# Patient Record
Sex: MALE | Race: WHITE | NOT HISPANIC OR LATINO | Employment: OTHER | URBAN - METROPOLITAN AREA
[De-identification: names, ages, dates, MRNs, and addresses within clinical notes are randomized per-mention and may not be internally consistent; named-entity substitution may affect disease eponyms.]

---

## 2017-04-20 ENCOUNTER — OFFICE VISIT (OUTPATIENT)
Dept: PULMONOLOGY | Facility: CLINIC | Age: 82
End: 2017-04-20
Payer: MEDICARE

## 2017-04-20 ENCOUNTER — LAB VISIT (OUTPATIENT)
Dept: LAB | Facility: HOSPITAL | Age: 82
End: 2017-04-20
Attending: INTERNAL MEDICINE
Payer: MEDICARE

## 2017-04-20 VITALS
BODY MASS INDEX: 25.97 KG/M2 | HEART RATE: 77 BPM | DIASTOLIC BLOOD PRESSURE: 87 MMHG | HEIGHT: 74 IN | WEIGHT: 202.38 LBS | SYSTOLIC BLOOD PRESSURE: 146 MMHG | OXYGEN SATURATION: 97 %

## 2017-04-20 DIAGNOSIS — J41.1 CHRONIC BRONCHITIS, MUCOPURULENT: ICD-10-CM

## 2017-04-20 DIAGNOSIS — J44.9 MIXED TYPE COPD (CHRONIC OBSTRUCTIVE PULMONARY DISEASE): Primary | ICD-10-CM

## 2017-04-20 DIAGNOSIS — J47.9 BRONCHIECTASIS WITHOUT COMPLICATION: ICD-10-CM

## 2017-04-20 DIAGNOSIS — J44.9 MIXED TYPE COPD (CHRONIC OBSTRUCTIVE PULMONARY DISEASE): ICD-10-CM

## 2017-04-20 DIAGNOSIS — I48.91 ATRIAL FIBRILLATION, UNSPECIFIED TYPE: ICD-10-CM

## 2017-04-20 LAB — DIGOXIN SERPL-MCNC: 0.6 NG/ML

## 2017-04-20 PROCEDURE — 99214 OFFICE O/P EST MOD 30 MIN: CPT | Mod: S$PBB,,, | Performed by: INTERNAL MEDICINE

## 2017-04-20 PROCEDURE — 82784 ASSAY IGA/IGD/IGG/IGM EACH: CPT

## 2017-04-20 PROCEDURE — 82785 ASSAY OF IGE: CPT

## 2017-04-20 PROCEDURE — 80162 ASSAY OF DIGOXIN TOTAL: CPT

## 2017-04-20 PROCEDURE — 82784 ASSAY IGA/IGD/IGG/IGM EACH: CPT | Mod: 59

## 2017-04-20 PROCEDURE — 99214 OFFICE O/P EST MOD 30 MIN: CPT | Mod: PBBFAC,PO | Performed by: INTERNAL MEDICINE

## 2017-04-20 PROCEDURE — 86648 DIPHTHERIA ANTIBODY: CPT

## 2017-04-20 PROCEDURE — 99999 PR PBB SHADOW E&M-EST. PATIENT-LVL IV: CPT | Mod: PBBFAC,,, | Performed by: INTERNAL MEDICINE

## 2017-04-20 RX ORDER — AZITHROMYCIN 500 MG/1
TABLET, FILM COATED ORAL
Qty: 3 TABLET | Refills: 3 | Status: SHIPPED | OUTPATIENT
Start: 2017-04-20 | End: 2018-07-23 | Stop reason: SDUPTHER

## 2017-04-20 NOTE — MR AVS SNAPSHOT
[facility]  [address]  [place] [address]  Phone: [phone]                  Vazquez [place]   2017 2:00 PM   Office Visit    Description:  Male : 1932   Provider:  Eitan Mendez MD   Department:  [facility]           Reason for Visit     COPD     Shortness of Breath     Cough     Wheezing     Sputum Production           Diagnoses this Visit        Comments    Mixed type COPD (chronic obstructive pulmonary disease)    -  Primary     Bronchiectasis without complication         Atrial fibrillation, unspecified type         Chronic bronchitis, mucopurulent                To Do List           Future Appointments        Provider Department Dept Phone    2017 2:00 PM MD [facility] [phone]      Goals (5 Years of Data)     None      Follow-Up and Disposition     Return in about 3 months (around 2017).    Follow-up and Disposition History       These Medications        Disp Refills Start End    azithromycin (ZITHROMAX) 500 MG tablet 3 tablet 3 2017     One daily for yellow mucous, repeat if needed.   Cut lanoxin in 1/2 for ten days after starting zithromycin and obtain blood level after 3 rd day.    Pharmacy: [facility] [address] [place] [address] AT [facility] [address] Ph #: [phone]       umeclidinium-vilanterol (ANORO ELLIPTA) 62.5-25 mcg/actuation DsDv 3 each 3 2017     Inhale 1 puff into the lungs once daily. - Inhalation    Pharmacy: [facility] - [address] Ph #: [phone]         [facility]     [facility] - 24/ Assistance  Unless otherwise directed by your provider, please contact [facility], our nurse care line that is available for 24/ assistance.     Registered nurses in the [facility] provide: appointment scheduling, clinical advisement, health education, and  other advisory services.  Call: [phone] (toll free)               Medications           Message regarding Medications     Verify the changes and/or additions to your medication regime listed below are the same as discussed with your clinician today.  If any of these changes or additions are incorrect, please notify your healthcare provider.        START taking these NEW medications        Refills    azithromycin (ZITHROMAX) 500 MG tablet 3    Sig: One daily for yellow mucous, repeat if needed.   Cut lanoxin in 1/2 for ten days after starting zithromycin and obtain blood level after 3 rd day.    Class: Print    umeclidinium-vilanterol (ANORO ELLIPTA) 62.5-25 mcg/actuation DsDv 3    Sig: Inhale 1 puff into the lungs once daily.    Class: Normal    Route: Inhalation      STOP taking these medications     fluticasone-vilanterol (BREO) 100-25 mcg/dose diskus inhaler Inhale 1 puff into the lungs once daily.    hydrocodone-acetaminophen 5-325mg (NORCO) 5-325 mg per tablet Take 1 tablet by mouth every 6 (six) hours as needed for Pain (cough).    SPIRIVA RESPIMAT 2.5 mcg/actuation Mist Inhale 2 Inhalers into the lungs once daily.           Verify that the below list of medications is an accurate representation of the medications you are currently taking.  If none reported, the list may be blank. If incorrect, please contact your healthcare provider. Carry this list with you in case of emergency.           Current Medications     amlodipine (NORVASC) 5 MG tablet Take 5 mg by mouth once daily.    aspirin (ECOTRIN) 81 MG EC tablet Take 81 mg by mouth once daily.    calcitRIOL (ROCALTROL) 0.25 MCG Cap     digoxin (LANOXIN) 125 mcg tablet Take 125 mcg by mouth once daily.    fish oil-omega-3 fatty acids 300-1,000 mg capsule Take 2 g by mouth once daily.    multivit, min cmb#20-iron-FA 27-1 mg Tab Take 1 tablet by mouth once daily.    potassium chloride (KLOR-CON) 10 MEQ TbSR     PRADAXA 75 mg Cap     PROAIR HFA 90  "mcg/actuation inhaler     simvastatin (ZOCOR) 20 MG tablet Take 20 mg by mouth every evening.    tamsulosin (FLOMAX) 0.4 mg Cp24 TAKE 1 CAPSULE ONCE DAILY    azithromycin (ZITHROMAX) 500 MG tablet One daily for yellow mucous, repeat if needed    azithromycin (ZITHROMAX) 500 MG tablet One daily for yellow mucous, repeat if needed.   Cut lanoxin in 1/2 for ten days after starting zithromycin and obtain blood level after 3 rd day.    predniSONE (DELTASONE) 20 MG tablet One daily for 7 days and repeat for bronchitis    umeclidinium-vilanterol (ANORO ELLIPTA) 62.5-25 mcg/actuation DsDv Inhale 1 puff into the lungs once daily.           Clinical Reference Information           Your Vitals Were     BP Pulse Height Weight SpO2 BMI    146/87 (BP Location: Right arm, Patient Position: Sitting, BP Method: Automatic) 77 6' 2" (1.88 m) 91.8 kg (202 lb 6.1 oz) 97% 25.98 kg/m2      Blood Pressure          Most Recent Value    BP  (!)  146/87      Allergies as of 4/20/2017     Hydrocodone      Immunizations Administered on Date of Encounter - 4/20/2017     None      Orders Placed During Today's Visit     Future Labs/Procedures Expected by Expires    Acapella treatment  4/20/2017 6/19/2018    Alpha 1 Antitrypsin Phenotype  4/20/2017 6/19/2018    Humoral Immune Eval (Pneumo 14) with H. flu  4/20/2017 6/19/2018    IgA  4/20/2017 6/19/2018    IgE  4/20/2017 6/19/2018    IgG  4/20/2017 6/19/2018    IgM  4/20/2017 6/19/2018    DIGOXIN LEVEL  8/20/2017 6/19/2018      Instructions    You have bronchiectasis seen on ct chest may 2016,   Check blood work - you had pneumonia vaccine less than a year ago.    Use acapella device 3-4 times a day- may need to try vest to control nearly cup of mucous daily you produce.    Change spiriva to anoro one a day.       Language Assistance Services     ATTENTION: Language assistance services are available, free of charge. Please call [phone].      ATENCIÓN: Si marlen pillai a vickers disposición " servicios gratuitos de asistencia lingüística. Ivet rodriguez [phone].     RC Ý: N?u b?n nói Ti?ng Vi?t, có các d?ch v? h? tr? ngôn ng? mi?n phí dành cho b?n. G?i s? [phone].         [facility] complies with applicable Federal civil rights laws and does not discriminate on the basis of race, color, national origin, age, disability, or sex.

## 2017-04-20 NOTE — PATIENT INSTRUCTIONS
You have bronchiectasis seen on ct chest may 2016,   Check blood work - you had pneumonia vaccine less than a year ago.    Use acapella device 3-4 times a day- may need to try vest to control nearly cup of mucous daily you produce.    Change spiriva to anoro one a day.

## 2017-04-20 NOTE — PROGRESS NOTES
"4/20/2017    Vazquez [place]  Office Note    Chief Complaint   Patient presents with    COPD    Shortness of Breath    Cough    Wheezing    Sputum Production     light green     April 20, 2017, took tripak 3 days for 5 times and have had no problem.  Has been on digoxin for afib . Has had yellow mucous from chest with negative cultures.    Has spontaneous pneumo 20 yrs ago, had tb at 40 yo.  Has had lung problems last 20 yrs with stagnant pattern. Mucous up to 1/2 cup a day.        Nov 22, 2016HPI: now cleared lingering flare, cultures neg, hydrocodone- excellent cough suppression and has 12 left. No cough, or wheeze or sob.        The chief compliant  problem is "stable   PFSH:  Past Medical History:   Diagnosis Date    Arthritis     Finger Rt hand    Atrial fibrillation 2012, 2013    Cardioversion X2  Dr. Cortes, Cardiologist    BPH (benign prostatic hyperplasia)     COPD (chronic obstructive pulmonary disease)     Hypertension     Kidney mass     Pneumothorax     25 years ago    TB (pulmonary tuberculosis)          Past Surgical History:   Procedure Laterality Date    colonscopy      EYE SURGERY  2013    Bilateral cataracs      head surgery      growth removed    KIDNEY SURGERY      L kidney removed.     LUNG SURGERY      PROSTATE SURGERY      Varicele       Social History   Substance Use Topics    Smoking status: Former Smoker     Quit date: 12/29/1969    Smokeless tobacco: Never Used    Alcohol use No     Family History   Problem Relation Age of Onset    Kidney disease Father     Tuberculosis Father      Review of patient's allergies indicates:   Allergen Reactions    Hydrocodone Itching       Performance Status:The patient's activity level is housebound activities.      Review of Systems:  a review of eleven systems covering constitutional, Eye, HEENT, Psych, Respiratory, Cardiac, GI, , Musculoskeletal, Endocrine, Dermatologic was negative except for pertinent findings as " "listed ABOVE and below: all good      Exam:Comprehensive exam done.   BP (!) 146/87 (BP Location: Right arm, Patient Position: Sitting, BP Method: Automatic)  Pulse 77  Ht 6' 2" (1.88 m)  Wt 91.8 kg (202 lb 6.1 oz)  SpO2 97%  BMI 25.98 kg/m2  Exam included Vitals as listed, and patient's appearance and affect and alertness and mood, oral exam for yeast and hygiene and pharynx lesions and Mallapatti (M) score, neck with inspection for jvd and masses and thyroid abnormalities and lymph nodes (supraclavicular and infraclavicular nodes and axillary also examined and noted if abn), chest exam included symmetry and effort and fremitus and percussion and auscultation, cardiac exam included rhythm and gallops and murmur and rubs and jvd and edema, abdominal exam for mass and hepatosplenomegaly and tenderness and hernias and bowel sounds, Musculoskeletal exam with muscle tone and posture and mobility/gait and  strength, and skin for rashes and cyanosis and pallor and turgor, extremity for clubbing.  Findings were normal except for pertinent findings listed below:  M 2 na d good bs no abn    Radiographs (ct chest and cxr) reviewed: view by direct vision  - both lower lungs suggest bronchiectasis  CT scans at this facility use dose modulation, iterative reconstruction, and/or weight based dosing when appropriate to reduce radiation dose to as low as reasonably achievable. COMPARISON: 12/10/2014 CT THORAX: There are emphysematous changes throughout the lungs. There is bilateral apical pleural parenchymal scarring with calcified pleural plaques. There is no pneumothorax. There are no pleural effusions or pulmonary nodules. There is a 5 cm bulla within the left apex. The heart and great vessels are normal in appearance. There is coronary artery calcification. There is no hilar or mediastinal adenopathy. The trachea and bronchi are normal. The visualized portion of the upper abdomen demonstrates prior left nephrectomy. " The right adrenal gland is normal. There are no osseous abnormalities. IMPRESSION: Moderate emphysematous changes throughout the lungs with no acute pulmonary process and no evidence of metastatic disease Diffuse coronary artery calcification Prior left nephrectomy Degenerative changes of the spine Read and Electronically Signed by: KITA COREY MD Date: 05/04/2016 16:05     Labs reviewed from last CBC,CMP,ABG,Micro last 3 entries  on EPIC result review        PFT was not done     Plan:  Clinical impression is apparently straight forward and impression with management as below.    Vazquez was seen today for copd, shortness of breath, cough, wheezing and sputum production.    Diagnoses and all orders for this visit:    Mixed type COPD (chronic obstructive pulmonary disease)  -     Humoral Immune Eval (Pneumo 14) with H. flu; Future  -     umeclidinium-vilanterol (ANORO ELLIPTA) 62.5-25 mcg/actuation DsDv; Inhale 1 puff into the lungs once daily.    Bronchiectasis without complication  -     Humoral Immune Eval (Pneumo 14) with H. flu; Future  -     IgA; Future  -     IgG; Future  -     IgE; Future  -     IgM; Future  -     Alpha 1 Antitrypsin Phenotype; Future  -     Acapella treatment; Future  -     umeclidinium-vilanterol (ANORO ELLIPTA) 62.5-25 mcg/actuation DsDv; Inhale 1 puff into the lungs once daily.    Atrial fibrillation, unspecified type  -     DIGOXIN LEVEL; Future    Chronic bronchitis, mucopurulent  -     Humoral Immune Eval (Pneumo 14) with H. flu; Future  -     IgA; Future  -     IgG; Future  -     IgE; Future  -     IgM; Future  -     Alpha 1 Antitrypsin Phenotype; Future  -     Acapella treatment; Future  -     umeclidinium-vilanterol (ANORO ELLIPTA) 62.5-25 mcg/actuation DsDv; Inhale 1 puff into the lungs once daily.    Other orders  -     azithromycin (ZITHROMAX) 500 MG tablet; One daily for yellow mucous, repeat if needed.   Cut lanoxin in 1/2 for ten days after starting zithromycin and  obtain blood level after 3 rd day.      Return in about 3 months (around 7/20/2017).    Discussed with patient above for education the following:        You have bronchiectasis seen on ct chest may 2016,   Check blood work - you had pneumonia vaccine less than a year ago.    Use acapella device 3-4 times a day- may need to try vest to control nearly cup of mucous daily you produce.    Change spiriva to anoro one a day.

## 2017-04-21 LAB
IGA SERPL-MCNC: 222 MG/DL
IGE SERPL-ACNC: <35 IU/ML
IGG SERPL-MCNC: 1173 MG/DL
IGM SERPL-MCNC: 36 MG/DL

## 2017-04-24 DIAGNOSIS — R89.9 ABNORMAL LABORATORY TEST RESULT: Primary | ICD-10-CM

## 2017-04-24 LAB
A1AT PHENOTYP SERPL-IMP: NORMAL BANDS
A1AT SERPL NEPH-MCNC: 160 MG/DL

## 2017-04-26 LAB
C DIPHTHERIAE AB SER IA-ACNC: 0.46 IU/ML
C TETANI AB SER-ACNC: 0.53 IU/ML
DEPRECATED S PNEUM 1 IGG SER-MCNC: 0.7 MCG/ML
DEPRECATED S PNEUM12 IGG SER-MCNC: 0.7 MCG/ML
DEPRECATED S PNEUM14 IGG SER-MCNC: 19.2 MCG/ML
DEPRECATED S PNEUM19 IGG SER-MCNC: 1.5 MCG/ML
DEPRECATED S PNEUM23 IGG SER-MCNC: 3.5 MCG/ML
DEPRECATED S PNEUM3 IGG SER-MCNC: 0.8 MCG/ML
DEPRECATED S PNEUM4 IGG SER-MCNC: 0.8 MCG/ML
DEPRECATED S PNEUM5 IGG SER-MCNC: 2.9 MCG/ML
DEPRECATED S PNEUM8 IGG SER-MCNC: 2.1 MCG/ML
DEPRECATED S PNEUM9 IGG SER-MCNC: 8.3 MCG/ML
HAEM INFLU B IGG SER-MCNC: 0.13 MG/L
S PNEUM DA 18C IGG SER-MCNC: 2.5 MCG/ML
S PNEUM DA 6B IGG SER-MCNC: 0.8 MCG/ML
S PNEUM DA 7F IGG SER-MCNC: 1 MCG/ML
S PNEUM DA 9V IGG SER-MCNC: 5 MCG/ML

## 2017-05-01 ENCOUNTER — TELEPHONE (OUTPATIENT)
Dept: PULMONOLOGY | Facility: CLINIC | Age: 82
End: 2017-05-01

## 2017-05-01 NOTE — TELEPHONE ENCOUNTER
Pt informed of low immune. Stated he got the Prevnar 13 last Wednesday and already scheduled appt.     ----- Message from Eitan Mendez MD sent at 4/26/2017 11:49 AM CDT -----  Pneumonia vaccine needed, Prevnar 13 preferred- but pneumovax would be fine.  Need to repeat titers in 6 weeks, would offer prompt referral to Dr Lange.  Had vaccine a year ago- i know.

## 2017-05-15 NOTE — TELEPHONE ENCOUNTER
Received request for refill of Flomax from [facility].  LOV 6/2015.    Left message for patient to call back.

## 2017-05-17 RX ORDER — TAMSULOSIN HYDROCHLORIDE 0.4 MG/1
1 CAPSULE ORAL DAILY
Qty: 90 CAPSULE | Refills: 0 | Status: SHIPPED | OUTPATIENT
Start: 2017-05-17 | End: 2018-07-23

## 2017-07-25 ENCOUNTER — OFFICE VISIT (OUTPATIENT)
Dept: PULMONOLOGY | Facility: CLINIC | Age: 82
End: 2017-07-25
Payer: MEDICARE

## 2017-07-25 VITALS
HEIGHT: 74 IN | WEIGHT: 208.56 LBS | BODY MASS INDEX: 26.77 KG/M2 | OXYGEN SATURATION: 96 % | DIASTOLIC BLOOD PRESSURE: 77 MMHG | HEART RATE: 48 BPM | SYSTOLIC BLOOD PRESSURE: 143 MMHG

## 2017-07-25 DIAGNOSIS — J44.1 COPD EXACERBATION: ICD-10-CM

## 2017-07-25 DIAGNOSIS — M54.41 ACUTE RIGHT-SIDED LOW BACK PAIN WITH RIGHT-SIDED SCIATICA: ICD-10-CM

## 2017-07-25 DIAGNOSIS — J41.1 CHRONIC BRONCHITIS, MUCOPURULENT: ICD-10-CM

## 2017-07-25 DIAGNOSIS — J44.9 MIXED TYPE COPD (CHRONIC OBSTRUCTIVE PULMONARY DISEASE): Primary | ICD-10-CM

## 2017-07-25 DIAGNOSIS — J47.9 BRONCHIECTASIS WITHOUT COMPLICATION: ICD-10-CM

## 2017-07-25 PROCEDURE — 99999 PR PBB SHADOW E&M-EST. PATIENT-LVL IV: CPT | Mod: PBBFAC,,, | Performed by: INTERNAL MEDICINE

## 2017-07-25 PROCEDURE — 1159F MED LIST DOCD IN RCRD: CPT | Mod: ,,, | Performed by: INTERNAL MEDICINE

## 2017-07-25 PROCEDURE — 99214 OFFICE O/P EST MOD 30 MIN: CPT | Mod: PBBFAC,PO | Performed by: INTERNAL MEDICINE

## 2017-07-25 PROCEDURE — 1126F AMNT PAIN NOTED NONE PRSNT: CPT | Mod: ,,, | Performed by: INTERNAL MEDICINE

## 2017-07-25 PROCEDURE — 99213 OFFICE O/P EST LOW 20 MIN: CPT | Mod: S$PBB,,, | Performed by: INTERNAL MEDICINE

## 2017-07-25 RX ORDER — ALBUTEROL SULFATE 90 UG/1
1-2 AEROSOL, METERED RESPIRATORY (INHALATION) EVERY 4 HOURS PRN
Qty: 3 INHALER | Refills: 3 | Status: SHIPPED | OUTPATIENT
Start: 2017-07-25 | End: 2019-01-04 | Stop reason: SDUPTHER

## 2017-07-25 RX ORDER — PREDNISONE 20 MG/1
TABLET ORAL
Qty: 28 TABLET | Refills: 1 | Status: SHIPPED | OUTPATIENT
Start: 2017-07-25 | End: 2019-08-10 | Stop reason: CLARIF

## 2017-07-25 RX ORDER — HYDROCODONE BITARTRATE AND ACETAMINOPHEN 5; 325 MG/1; MG/1
1 TABLET ORAL EVERY 4 HOURS PRN
Qty: 90 TABLET | Refills: 0 | Status: SHIPPED | OUTPATIENT
Start: 2017-07-25 | End: 2018-01-25 | Stop reason: SDUPTHER

## 2017-07-25 NOTE — PROGRESS NOTES
"7/25/2017    [facility]  Office Note    Chief Complaint   Patient presents with    Shortness of Breath     same as before, nothing new     Wheezing     same as before, nothing new     July 25,2017-  Feeling better, having dry nonproductive cough, no abx/sterois      April 20, 2017, took tripak 3 days for 5 times and have had no problem.  Has been on digoxin for afib . Has had yellow mucous from chest with negative cultures.    Has spontaneous pneumo 20 yrs ago, had tb at 40 yo.  Has had lung problems last 20 yrs with stagnant pattern. Mucous up to 1/2 cup a day.        Nov 22, 2016HPI: now cleared lingering flare, cultures neg, hydrocodone- excellent cough suppression and has 12 left. No cough, or wheeze or sob.        The chief compliant  problem is "stable   PFSH:  Past Medical History:   Diagnosis Date    Arthritis     Finger Rt hand    Atrial fibrillation 2012, 2013    Cardioversion X2  Dr. Cortes, Cardiologist    BPH (benign prostatic hyperplasia)     COPD (chronic obstructive pulmonary disease)     Hypertension     Kidney mass     Pneumothorax     25 years ago    TB (pulmonary tuberculosis)          Past Surgical History:   Procedure Laterality Date    colonscopy      EYE SURGERY  2013    Bilateral cataracs      head surgery      growth removed    KIDNEY SURGERY      L kidney removed.     LUNG SURGERY      PROSTATE SURGERY      Varicele       Social History   Substance Use Topics    Smoking status: Former Smoker     Quit date: 12/29/1969    Smokeless tobacco: Never Used    Alcohol use No     Family History   Problem Relation Age of Onset    Kidney disease Father     Tuberculosis Father      Review of patient's allergies indicates:   Allergen Reactions    Hydrocodone Itching       Performance Status:The patient's activity level is housebound activities.      Review of Systems:  a review of eleven systems covering constitutional, Eye, HEENT, Psych, Respiratory, Cardiac, GI, " ", Musculoskeletal, Endocrine, Dermatologic was negative except for pertinent findings as listed ABOVE and below: all good      Exam:Comprehensive exam done.   BP (!) 143/77 (BP Location: Left arm, Patient Position: Sitting, BP Method: Automatic)   Pulse (!) 48   Ht 6' 2" (1.88 m)   Wt 94.6 kg (208 lb 8.9 oz)   SpO2 96%   BMI 26.78 kg/m²   Exam included Vitals as listed, and patient's appearance and affect and alertness and mood, oral exam for yeast and hygiene and pharynx lesions and Mallapatti (M) score, neck with inspection for jvd and masses and thyroid abnormalities and lymph nodes (supraclavicular and infraclavicular nodes and axillary also examined and noted if abn), chest exam included symmetry and effort and fremitus and percussion and auscultation, cardiac exam included rhythm and gallops and murmur and rubs and jvd and edema, abdominal exam for mass and hepatosplenomegaly and tenderness and hernias and bowel sounds, Musculoskeletal exam with muscle tone and posture and mobility/gait and  strength, and skin for rashes and cyanosis and pallor and turgor, extremity for clubbing.  Findings were normal except for pertinent findings listed below:  M 2 na d good bs no abn    Radiographs (ct chest and cxr) reviewed: view by direct vision  - both lower lungs suggest bronchiectasis  CT scans at this facility use dose modulation, iterative reconstruction, and/or weight based dosing when appropriate to reduce radiation dose to as low as reasonably achievable. COMPARISON: 12/10/2014 CT THORAX: There are emphysematous changes throughout the lungs. There is bilateral apical pleural parenchymal scarring with calcified pleural plaques. There is no pneumothorax. There are no pleural effusions or pulmonary nodules. There is a 5 cm bulla within the left apex. The heart and great vessels are normal in appearance. There is coronary artery calcification. There is no hilar or mediastinal adenopathy. The trachea and " bronchi are normal. The visualized portion of the upper abdomen demonstrates prior left nephrectomy. The right adrenal gland is normal. There are no osseous abnormalities. IMPRESSION: Moderate emphysematous changes throughout the lungs with no acute pulmonary process and no evidence of metastatic disease Diffuse coronary artery calcification Prior left nephrectomy Degenerative changes of the spine Read and Electronically Signed by: KITA COREY MD Date: 05/04/2016 16:05     Labs reviewed from last CBC,CMP,ABG,Micro last 3 entries  on EPIC result review        PFT was not done     Plan:  Clinical impression is apparently straight forward and impression with management as below.    Vazquez was seen today for shortness of breath and wheezing.    Diagnoses and all orders for this visit:    Mixed type COPD (chronic obstructive pulmonary disease)  -     PROAIR HFA 90 mcg/actuation inhaler; Inhale 1-2 puffs into the lungs every 4 (four) hours as needed for Wheezing.  -     hydrocodone-acetaminophen 5-325mg (NORCO) 5-325 mg per tablet; Take 1 tablet by mouth every 4 (four) hours as needed for Pain (cough).    Bronchiectasis without complication  -     PROAIR HFA 90 mcg/actuation inhaler; Inhale 1-2 puffs into the lungs every 4 (four) hours as needed for Wheezing.    COPD exacerbation  -     predniSONE (DELTASONE) 20 MG tablet; One daily for 7 days and repeat for bronchitis  -     PROAIR HFA 90 mcg/actuation inhaler; Inhale 1-2 puffs into the lungs every 4 (four) hours as needed for Wheezing.    Chronic bronchitis, mucopurulent  -     predniSONE (DELTASONE) 20 MG tablet; One daily for 7 days and repeat for bronchitis  -     PROAIR HFA 90 mcg/actuation inhaler; Inhale 1-2 puffs into the lungs every 4 (four) hours as needed for Wheezing.  -     hydrocodone-acetaminophen 5-325mg (NORCO) 5-325 mg per tablet; Take 1 tablet by mouth every 4 (four) hours as needed for Pain (cough).    Acute right-sided low back pain with  right-sided sciatica  -     hydrocodone-acetaminophen 5-325mg (NORCO) 5-325 mg per tablet; Take 1 tablet by mouth every 4 (four) hours as needed for Pain (cough).        Return in about 6 months (around 1/25/2018), or if symptoms worsen or fail to improve.    Discussed with patient above for education the following:        Use norco for violent cough or pain.    Use prednisone for bad cough.    Continue anoro/inhalers.  Use norco for violent cough or pain.    Use prednisone for bad cough.    Continue anoro/inhalers.          If mucous becomes major chronic issues, a vest device may help???

## 2017-07-25 NOTE — PATIENT INSTRUCTIONS
Use norco for violent cough or pain.    Use prednisone for bad cough.    Continue anoro/inhalers.          If mucous becomes major chronic issues, a vest device may help???

## 2018-01-25 ENCOUNTER — OFFICE VISIT (OUTPATIENT)
Dept: PULMONOLOGY | Facility: CLINIC | Age: 83
End: 2018-01-25
Payer: MEDICARE

## 2018-01-25 VITALS
HEART RATE: 58 BPM | HEIGHT: 74 IN | DIASTOLIC BLOOD PRESSURE: 84 MMHG | OXYGEN SATURATION: 94 % | BODY MASS INDEX: 26.66 KG/M2 | WEIGHT: 207.69 LBS | SYSTOLIC BLOOD PRESSURE: 180 MMHG

## 2018-01-25 DIAGNOSIS — M54.41 ACUTE RIGHT-SIDED LOW BACK PAIN WITH RIGHT-SIDED SCIATICA: ICD-10-CM

## 2018-01-25 DIAGNOSIS — J47.9 BRONCHIECTASIS WITHOUT COMPLICATION: Primary | ICD-10-CM

## 2018-01-25 DIAGNOSIS — J44.1 COPD EXACERBATION: ICD-10-CM

## 2018-01-25 DIAGNOSIS — J44.9 MIXED TYPE COPD (CHRONIC OBSTRUCTIVE PULMONARY DISEASE): ICD-10-CM

## 2018-01-25 DIAGNOSIS — J41.1 CHRONIC BRONCHITIS, MUCOPURULENT: ICD-10-CM

## 2018-01-25 PROCEDURE — 99999 PR PBB SHADOW E&M-EST. PATIENT-LVL IV: CPT | Mod: PBBFAC,,, | Performed by: INTERNAL MEDICINE

## 2018-01-25 PROCEDURE — 99214 OFFICE O/P EST MOD 30 MIN: CPT | Mod: PBBFAC,PO | Performed by: INTERNAL MEDICINE

## 2018-01-25 PROCEDURE — 99214 OFFICE O/P EST MOD 30 MIN: CPT | Mod: S$PBB,,, | Performed by: INTERNAL MEDICINE

## 2018-01-25 RX ORDER — LEVOFLOXACIN 500 MG/1
500 TABLET, FILM COATED ORAL DAILY
Qty: 10 TABLET | Refills: 2 | Status: SHIPPED | OUTPATIENT
Start: 2018-01-25 | End: 2018-10-31 | Stop reason: SDUPTHER

## 2018-01-25 RX ORDER — HYDROCODONE BITARTRATE AND ACETAMINOPHEN 5; 325 MG/1; MG/1
1 TABLET ORAL EVERY 4 HOURS PRN
Qty: 60 TABLET | Refills: 0 | Status: SHIPPED | OUTPATIENT
Start: 2018-01-25 | End: 2018-07-23

## 2018-01-25 RX ORDER — OSELTAMIVIR PHOSPHATE 75 MG/1
75 CAPSULE ORAL 2 TIMES DAILY
Qty: 10 CAPSULE | Refills: 0 | Status: SHIPPED | OUTPATIENT
Start: 2018-01-25 | End: 2018-01-30

## 2018-01-25 RX ORDER — PREDNISONE 20 MG/1
TABLET ORAL
Qty: 12 TABLET | Refills: 0 | Status: SHIPPED | OUTPATIENT
Start: 2018-01-25 | End: 2018-10-31 | Stop reason: SDUPTHER

## 2018-01-25 RX ORDER — HYDROCODONE BITARTRATE AND ACETAMINOPHEN 5; 325 MG/1; MG/1
1 TABLET ORAL EVERY 6 HOURS PRN
Qty: 60 TABLET | Refills: 0 | Status: SHIPPED | OUTPATIENT
Start: 2018-01-25 | End: 2018-07-23

## 2018-01-25 NOTE — PATIENT INSTRUCTIONS
Use tamiflu if flu - fever, headache,cough, muscle ache, sore throat- call      Use prednisone for 3 days for bronchitis    Use azithro for mild bronchitis, use levaquin for worse bronchitis.    Use trelegy in place anoro.    Do chest xray within next 6 months or if needed.

## 2018-01-25 NOTE — PROGRESS NOTES
"1/25/2018    [facility]  Office Note    Chief Complaint   Patient presents with    Follow-up     6 month    COPD    Cough    Sputum Production     sometimes gets it up Ivory in color     Jan 25, 2018- having ivory mucous last 6 wks with violent cough, breathing ok, appetite ok    July 25,2017-  Feeling better, having dry nonproductive cough, no abx/sterois      April 20, 2017, took tripak 3 days for 5 times and have had no problem.  Has been on digoxin for afib . Has had yellow mucous from chest with negative cultures.    Has spontaneous pneumo 20 yrs ago, had tb at 40 yo.  Has had lung problems last 20 yrs with stagnant pattern. Mucous up to 1/2 cup a day.        Nov 22, 2016HPI: now cleared lingering flare, cultures neg, hydrocodone- excellent cough suppression and has 12 left. No cough, or wheeze or sob.        The chief compliant  problem is "stable   PFSH:  Past Medical History:   Diagnosis Date    Arthritis     Finger Rt hand    Atrial fibrillation 2012, 2013    Cardioversion X2  Dr. Cortes, Cardiologist    BPH (benign prostatic hyperplasia)     COPD (chronic obstructive pulmonary disease)     Hypertension     Kidney mass     Pneumothorax     25 years ago    TB (pulmonary tuberculosis)          Past Surgical History:   Procedure Laterality Date    colonscopy      EYE SURGERY  2013    Bilateral cataracs      head surgery      growth removed    KIDNEY SURGERY      L kidney removed.     LUNG SURGERY      PROSTATE SURGERY      Varicele       Social History   Substance Use Topics    Smoking status: Former Smoker     Quit date: 12/29/1969    Smokeless tobacco: Never Used    Alcohol use No     Family History   Problem Relation Age of Onset    Kidney disease Father     Tuberculosis Father      Review of patient's allergies indicates:   Allergen Reactions    Hydrocodone Itching       Performance Status:The patient's activity level is housebound activities.      Review of Systems:  " "a review of eleven systems covering constitutional, Eye, HEENT, Psych, Respiratory, Cardiac, GI, , Musculoskeletal, Endocrine, Dermatologic was negative except for pertinent findings as listed ABOVE and below: all good      Exam:Comprehensive exam done.   BP (!) 180/84 (BP Location: Right arm, Patient Position: Sitting)   Pulse (!) 58   Ht 6' 2" (1.88 m)   Wt 94.2 kg (207 lb 10.8 oz)   SpO2 (!) 94%   BMI 26.66 kg/m²   Exam included Vitals as listed, and patient's appearance and affect and alertness and mood, oral exam for yeast and hygiene and pharynx lesions and Mallapatti (M) score, neck with inspection for jvd and masses and thyroid abnormalities and lymph nodes (supraclavicular and infraclavicular nodes and axillary also examined and noted if abn), chest exam included symmetry and effort and fremitus and percussion and auscultation, cardiac exam included rhythm and gallops and murmur and rubs and jvd and edema, abdominal exam for mass and hepatosplenomegaly and tenderness and hernias and bowel sounds, Musculoskeletal exam with muscle tone and posture and mobility/gait and  strength, and skin for rashes and cyanosis and pallor and turgor, extremity for clubbing.  Findings were normal except for pertinent findings listed below:  M 2 nad good bs no abn    Radiographs (ct chest and cxr) reviewed: view by direct vision  - both lower lungs suggest bronchiectasis  CT scans at this facility use dose modulation, iterative reconstruction, and/or weight based dosing when appropriate to reduce radiation dose to as low as reasonably achievable. COMPARISON: 12/10/2014 CT THORAX: There are emphysematous changes throughout the lungs. There is bilateral apical pleural parenchymal scarring with calcified pleural plaques. There is no pneumothorax. There are no pleural effusions or pulmonary nodules. There is a 5 cm bulla within the left apex. The heart and great vessels are normal in appearance. There is coronary artery " calcification. There is no hilar or mediastinal adenopathy. The trachea and bronchi are normal. The visualized portion of the upper abdomen demonstrates prior left nephrectomy. The right adrenal gland is normal. There are no osseous abnormalities. IMPRESSION: Moderate emphysematous changes throughout the lungs with no acute pulmonary process and no evidence of metastatic disease Diffuse coronary artery calcification Prior left nephrectomy Degenerative changes of the spine Read and Electronically Signed by: KIAT COREY MD Date: 05/04/2016 16:05     Labs reviewed from last CBC,CMP,ABG,Micro last 3 entries  on EPIC result review        PFT was not done     Plan:  Clinical impression is apparently straight forward and impression with management as below.    Vazquez was seen today for follow-up, copd, cough and sputum production.    Diagnoses and all orders for this visit:    Bronchiectasis without complication  -     oseltamivir (TAMIFLU) 75 MG capsule; Take 1 capsule (75 mg total) by mouth 2 (two) times daily.  -     Discontinue: fluticasone-umeclidin-vilanter (TRELEGY ELLIPTA) 100-62.5-25 mcg DsDv; Inhale 1 puff into the lungs once daily.  -     predniSONE (DELTASONE) 20 MG tablet; One daily for 3 days and repeat for flare of lung symptoms as intructed  -     levoFLOXacin (LEVAQUIN) 500 MG tablet; Take 1 tablet (500 mg total) by mouth once daily.  -     fluticasone-umeclidin-vilanter (TRELEGY ELLIPTA) 100-62.5-25 mcg DsDv; Inhale 1 puff into the lungs once daily.  -     X-Ray Chest PA And Lateral; Future    Chronic bronchitis, mucopurulent  -     oseltamivir (TAMIFLU) 75 MG capsule; Take 1 capsule (75 mg total) by mouth 2 (two) times daily.  -     Discontinue: fluticasone-umeclidin-vilanter (TRELEGY ELLIPTA) 100-62.5-25 mcg DsDv; Inhale 1 puff into the lungs once daily.  -     predniSONE (DELTASONE) 20 MG tablet; One daily for 3 days and repeat for flare of lung symptoms as intructed  -     levoFLOXacin  (LEVAQUIN) 500 MG tablet; Take 1 tablet (500 mg total) by mouth once daily.  -     hydrocodone-acetaminophen 5-325mg (NORCO) 5-325 mg per tablet; Take 1 tablet by mouth every 4 (four) hours as needed for Pain (cough).  -     fluticasone-umeclidin-vilanter (TRELEGY ELLIPTA) 100-62.5-25 mcg DsDv; Inhale 1 puff into the lungs once daily.  -     X-Ray Chest PA And Lateral; Future    Mixed type COPD (chronic obstructive pulmonary disease)  -     hydrocodone-acetaminophen 5-325mg (NORCO) 5-325 mg per tablet; Take 1 tablet by mouth every 4 (four) hours as needed for Pain (cough).  -     hydrocodone-acetaminophen 5-325mg (NORCO) 5-325 mg per tablet; Take 1 tablet by mouth every 6 (six) hours as needed for Pain (cough).  -     X-Ray Chest PA And Lateral; Future    Acute right-sided low back pain with right-sided sciatica  -     hydrocodone-acetaminophen 5-325mg (NORCO) 5-325 mg per tablet; Take 1 tablet by mouth every 4 (four) hours as needed for Pain (cough).  -     hydrocodone-acetaminophen 5-325mg (NORCO) 5-325 mg per tablet; Take 1 tablet by mouth every 6 (six) hours as needed for Pain (cough).  -     X-Ray Chest PA And Lateral; Future    COPD exacerbation  -     predniSONE (DELTASONE) 20 MG tablet; One daily for 3 days and repeat for flare of lung symptoms as intructed  -     levoFLOXacin (LEVAQUIN) 500 MG tablet; Take 1 tablet (500 mg total) by mouth once daily.  -     hydrocodone-acetaminophen 5-325mg (NORCO) 5-325 mg per tablet; Take 1 tablet by mouth every 4 (four) hours as needed for Pain (cough).  -     hydrocodone-acetaminophen 5-325mg (NORCO) 5-325 mg per tablet; Take 1 tablet by mouth every 6 (six) hours as needed for Pain (cough).  -     fluticasone-umeclidin-vilanter (TRELEGY ELLIPTA) 100-62.5-25 mcg DsDv; Inhale 1 puff into the lungs once daily.  -     X-Ray Chest PA And Lateral; Future        Follow-up in about 6 months (around 7/25/2018), or if symptoms worsen or fail to improve.    Discussed with patient  above for education the following:      Use tamiflu if flu - fever, headache,cough, muscle ache, sore throat- call      Use prednisone for 3 days for bronchitis    Use azithro for mild bronchitis, use levaquin for worse bronchitis.    Use trelegy in place anoro.    Do chest xray within next 6 months or if needed.

## 2018-07-10 ENCOUNTER — HOSPITAL ENCOUNTER (OUTPATIENT)
Dept: RADIOLOGY | Facility: HOSPITAL | Age: 83
Discharge: HOME | End: 2018-07-10
Attending: INTERNAL MEDICINE
Payer: MEDICARE

## 2018-07-10 ENCOUNTER — TELEPHONE (OUTPATIENT)
Dept: PULMONOLOGY | Facility: CLINIC | Age: 83
End: 2018-07-10

## 2018-07-10 DIAGNOSIS — J47.9 BRONCHIECTASIS WITHOUT COMPLICATION: ICD-10-CM

## 2018-07-10 DIAGNOSIS — M54.41 ACUTE RIGHT-SIDED LOW BACK PAIN WITH RIGHT-SIDED SCIATICA: ICD-10-CM

## 2018-07-10 DIAGNOSIS — J44.9 MIXED TYPE COPD (CHRONIC OBSTRUCTIVE PULMONARY DISEASE): ICD-10-CM

## 2018-07-10 DIAGNOSIS — J44.1 COPD EXACERBATION: ICD-10-CM

## 2018-07-10 DIAGNOSIS — J41.1 CHRONIC BRONCHITIS, MUCOPURULENT: ICD-10-CM

## 2018-07-10 PROCEDURE — 71046 X-RAY EXAM CHEST 2 VIEWS: CPT | Mod: 26,,, | Performed by: RADIOLOGY

## 2018-07-10 PROCEDURE — 71046 X-RAY EXAM CHEST 2 VIEWS: CPT | Mod: TC,FY

## 2018-07-10 NOTE — TELEPHONE ENCOUNTER
Per Dr. Mendez patient notified.    ----- Message from Eitan Mendez MD sent at 7/10/2018  1:18 PM CDT -----  Notify stable cxr

## 2018-07-23 ENCOUNTER — OFFICE VISIT (OUTPATIENT)
Dept: PULMONOLOGY | Facility: CLINIC | Age: 83
End: 2018-07-23
Payer: MEDICARE

## 2018-07-23 VITALS
BODY MASS INDEX: 25.8 KG/M2 | OXYGEN SATURATION: 97 % | HEIGHT: 74 IN | WEIGHT: 201.06 LBS | SYSTOLIC BLOOD PRESSURE: 165 MMHG | DIASTOLIC BLOOD PRESSURE: 72 MMHG | HEART RATE: 47 BPM

## 2018-07-23 DIAGNOSIS — J44.9 MIXED TYPE COPD (CHRONIC OBSTRUCTIVE PULMONARY DISEASE): ICD-10-CM

## 2018-07-23 DIAGNOSIS — D84.9 IMMUNE DEFICIENCY DISORDER: ICD-10-CM

## 2018-07-23 DIAGNOSIS — M54.41 ACUTE RIGHT-SIDED LOW BACK PAIN WITH RIGHT-SIDED SCIATICA: ICD-10-CM

## 2018-07-23 DIAGNOSIS — J44.1 COPD EXACERBATION: ICD-10-CM

## 2018-07-23 DIAGNOSIS — J41.1 CHRONIC BRONCHITIS, MUCOPURULENT: ICD-10-CM

## 2018-07-23 DIAGNOSIS — J47.9 BRONCHIECTASIS WITHOUT COMPLICATION: Primary | ICD-10-CM

## 2018-07-23 PROCEDURE — 99214 OFFICE O/P EST MOD 30 MIN: CPT | Mod: PBBFAC,PO | Performed by: INTERNAL MEDICINE

## 2018-07-23 PROCEDURE — 99214 OFFICE O/P EST MOD 30 MIN: CPT | Mod: S$PBB,,, | Performed by: INTERNAL MEDICINE

## 2018-07-23 PROCEDURE — 99999 PR PBB SHADOW E&M-EST. PATIENT-LVL IV: CPT | Mod: PBBFAC,,, | Performed by: INTERNAL MEDICINE

## 2018-07-23 RX ORDER — HYDROCODONE BITARTRATE AND ACETAMINOPHEN 5; 325 MG/1; MG/1
1 TABLET ORAL EVERY 4 HOURS PRN
Qty: 60 TABLET | Refills: 0 | Status: SHIPPED | OUTPATIENT
Start: 2018-07-23 | End: 2018-10-31 | Stop reason: SDUPTHER

## 2018-07-23 RX ORDER — GABAPENTIN 300 MG/1
CAPSULE ORAL
Refills: 4 | COMMUNITY
Start: 2018-07-20 | End: 2020-11-23 | Stop reason: SDUPTHER

## 2018-07-23 RX ORDER — HYDROCODONE BITARTRATE AND ACETAMINOPHEN 5; 325 MG/1; MG/1
1 TABLET ORAL EVERY 6 HOURS PRN
Qty: 60 TABLET | Refills: 0 | Status: SHIPPED | OUTPATIENT
Start: 2018-08-23 | End: 2018-10-31 | Stop reason: SDUPTHER

## 2018-07-23 RX ORDER — BLOOD-GLUCOSE METER
EACH MISCELLANEOUS
Refills: 2 | COMMUNITY
Start: 2018-07-03 | End: 2021-02-23

## 2018-07-23 RX ORDER — AZITHROMYCIN 500 MG/1
TABLET, FILM COATED ORAL
Qty: 3 TABLET | Refills: 3 | Status: SHIPPED | OUTPATIENT
Start: 2018-07-23 | End: 2018-10-31 | Stop reason: ALTCHOICE

## 2018-07-23 NOTE — PATIENT INSTRUCTIONS
Use norco for intractable cough.    trelegy for lung    Immune system weak and so are lungs- use azithromycin for yellow mucous    Avoid prednisone - use if needed.

## 2018-07-23 NOTE — PROGRESS NOTES
"7/23/2018    [facility]  Office Note    Chief Complaint   Patient presents with    Follow-up   copd f/u    July 23, 2018- took abx but run out abx, uses norco for violent cough.  trelegy works great.  Jan 25, 2018- having ivory mucous last 6 wks with violent cough, breathing ok, appetite ok  July 25,2017-  Feeling better, having dry nonproductive cough, no abx/sterois  April 20, 2017, took tripak 3 days for 5 times and have had no problem.  Has been on digoxin for afib . Has had yellow mucous from chest with negative cultures.  Has spontneous pneumo 20 yrs ago, had tb at 40 yo.  Has had lung problems last 20 yrs with stagnant pattern. Mucous up to 1/2 cup a day.  Nov 22, 2016HPI: now cleared lingering flare, cultures neg, hydrocodone- excellent cough suppression and has 12 left. No cough, or wheeze or sob.  The chief compliant  problem is "stable   PFSH:  Past Medical History:   Diagnosis Date    Arthritis     Finger Rt hand    Atrial fibrillation 2012, 2013    Cardioversion X2  Dr. Cortes, Cardiologist    BPH (benign prostatic hyperplasia)     COPD (chronic obstructive pulmonary disease)     Hypertension     Kidney mass     Pneumothorax     25 years ago    TB (pulmonary tuberculosis)          Past Surgical History:   Procedure Laterality Date    colonscopy      EYE SURGERY  2013    Bilateral cataracs      head surgery      growth removed    KIDNEY SURGERY      L kidney removed.     LUNG SURGERY      PROSTATE SURGERY      Varicele       Social History   Substance Use Topics    Smoking status: Former Smoker     Quit date: 12/29/1969    Smokeless tobacco: Never Used    Alcohol use No     Family History   Problem Relation Age of Onset    Kidney disease Father     Tuberculosis Father      Review of patient's allergies indicates:   Allergen Reactions    Hydrocodone Itching       Performance Status:The patient's activity level is housebound activities.      Review of Systems:  a review " "of eleven systems covering constitutional, Eye, HEENT, Psych, Respiratory, Cardiac, GI, , Musculoskeletal, Endocrine, Dermatologic was negative except for pertinent findings as listed ABOVE and below: all good      Exam:Comprehensive exam done.   BP (!) 165/72 (BP Location: Left arm, Patient Position: Sitting)   Pulse (!) 47   Ht 6' 2" (1.88 m)   Wt 91.2 kg (201 lb 1 oz)   SpO2 97%   BMI 25.81 kg/m²   Exam included Vitals as listed, and patient's appearance and affect and alertness and mood, oral exam for yeast and hygiene and pharynx lesions and Mallapatti (M) score, neck with inspection for jvd and masses and thyroid abnormalities and lymph nodes (supraclavicular and infraclavicular nodes and axillary also examined and noted if abn), chest exam included symmetry and effort and fremitus and percussion and auscultation, cardiac exam included rhythm and gallops and murmur and rubs and jvd and edema, abdominal exam for mass and hepatosplenomegaly and tenderness and hernias and bowel sounds, Musculoskeletal exam with muscle tone and posture and mobility/gait and  strength, and skin for rashes and cyanosis and pallor and turgor, extremity for clubbing.  Findings were normal except for pertinent findings listed below:  M 2 nad good bs no abn    Radiographs (ct chest and cxr) reviewed: view by direct vision  - both lower lungs suggest bronchiectasis  CT scans at this facility use dose modulation, iterative reconstruction, and/or weight based dosing when appropriate to reduce radiation dose to as low as reasonably achievable. COMPARISON: 12/10/2014 CT THORAX: There are emphysematous changes throughout the lungs. There is bilateral apical pleural parenchymal scarring with calcified pleural plaques. There is no pneumothorax. There are no pleural effusions or pulmonary nodules. There is a 5 cm bulla within the left apex. The heart and great vessels are normal in appearance. There is coronary artery calcification. " There is no hilar or mediastinal adenopathy. The trachea and bronchi are normal. The visualized portion of the upper abdomen demonstrates prior left nephrectomy. The right adrenal gland is normal. There are no osseous abnormalities. IMPRESSION: Moderate emphysematous changes throughout the lungs with no acute pulmonary process and no evidence of metastatic disease Diffuse coronary artery calcification Prior left nephrectomy Degenerative changes of the spine Read and Electronically Signed by: KITA COREY MD Date: 05/04/2016 16:05     Labs reviewed from last CBC,CMP,ABG,Micro last 3 entries  on EPIC result review        PFT was not done     Plan:  Clinical impression is apparently straight forward and impression with management as below.    Vazquez was seen today for follow-up.    Diagnoses and all orders for this visit:    Bronchiectasis without complication  -     azithromycin (ZITHROMAX) 500 MG tablet; One daily for yellow mucous, repeat if needed.   Cut lanoxin in 1/2 for ten days after starting zithromycin and obtain blood level after 3 rd day.  -     fluticasone-umeclidin-vilanter (TRELEGY ELLIPTA) 100-62.5-25 mcg DsDv; Inhale 1 puff into the lungs once daily.    Chronic bronchitis, mucopurulent  -     azithromycin (ZITHROMAX) 500 MG tablet; One daily for yellow mucous, repeat if needed.   Cut lanoxin in 1/2 for ten days after starting zithromycin and obtain blood level after 3 rd day.  -     fluticasone-umeclidin-vilanter (TRELEGY ELLIPTA) 100-62.5-25 mcg DsDv; Inhale 1 puff into the lungs once daily.  -     HYDROcodone-acetaminophen (NORCO) 5-325 mg per tablet; Take 1 tablet by mouth every 4 (four) hours as needed for Pain (cough).    Mixed type COPD (chronic obstructive pulmonary disease)  -     HYDROcodone-acetaminophen (NORCO) 5-325 mg per tablet; Take 1 tablet by mouth every 4 (four) hours as needed for Pain (cough).  -     HYDROcodone-acetaminophen (NORCO) 5-325 mg per tablet; Take 1 tablet by  mouth every 6 (six) hours as needed for Pain (cough).    Immune deficiency disorder  -     azithromycin (ZITHROMAX) 500 MG tablet; One daily for yellow mucous, repeat if needed.   Cut lanoxin in 1/2 for ten days after starting zithromycin and obtain blood level after 3 rd day.    COPD exacerbation  -     fluticasone-umeclidin-vilanter (TRELEGY ELLIPTA) 100-62.5-25 mcg DsDv; Inhale 1 puff into the lungs once daily.  -     HYDROcodone-acetaminophen (NORCO) 5-325 mg per tablet; Take 1 tablet by mouth every 4 (four) hours as needed for Pain (cough).  -     HYDROcodone-acetaminophen (NORCO) 5-325 mg per tablet; Take 1 tablet by mouth every 6 (six) hours as needed for Pain (cough).    Acute right-sided low back pain with right-sided sciatica  -     HYDROcodone-acetaminophen (NORCO) 5-325 mg per tablet; Take 1 tablet by mouth every 4 (four) hours as needed for Pain (cough).  -     HYDROcodone-acetaminophen (NORCO) 5-325 mg per tablet; Take 1 tablet by mouth every 6 (six) hours as needed for Pain (cough).        Follow-up in about 3 months (around 10/23/2018).    Discussed with patient above for education the following:        Use norco for intractable cough.    trelegy for lung    Immune system weak and so are lungs- use azithromycin for yellow mucous    Avoid prednisone - use if needed.

## 2018-10-31 ENCOUNTER — OFFICE VISIT (OUTPATIENT)
Dept: PULMONOLOGY | Facility: CLINIC | Age: 83
End: 2018-10-31
Payer: MEDICARE

## 2018-10-31 VITALS
WEIGHT: 207 LBS | HEART RATE: 63 BPM | HEIGHT: 74 IN | DIASTOLIC BLOOD PRESSURE: 71 MMHG | SYSTOLIC BLOOD PRESSURE: 156 MMHG | BODY MASS INDEX: 26.56 KG/M2 | OXYGEN SATURATION: 93 %

## 2018-10-31 DIAGNOSIS — J47.9 BRONCHIECTASIS WITHOUT COMPLICATION: ICD-10-CM

## 2018-10-31 DIAGNOSIS — M54.41 ACUTE RIGHT-SIDED LOW BACK PAIN WITH RIGHT-SIDED SCIATICA: ICD-10-CM

## 2018-10-31 DIAGNOSIS — J41.1 CHRONIC BRONCHITIS, MUCOPURULENT: ICD-10-CM

## 2018-10-31 DIAGNOSIS — J44.9 MIXED TYPE COPD (CHRONIC OBSTRUCTIVE PULMONARY DISEASE): Primary | ICD-10-CM

## 2018-10-31 DIAGNOSIS — J44.1 COPD EXACERBATION: ICD-10-CM

## 2018-10-31 PROCEDURE — 99999 PR PBB SHADOW E&M-EST. PATIENT-LVL III: CPT | Mod: PBBFAC,,, | Performed by: INTERNAL MEDICINE

## 2018-10-31 PROCEDURE — 99213 OFFICE O/P EST LOW 20 MIN: CPT | Mod: S$PBB,,, | Performed by: INTERNAL MEDICINE

## 2018-10-31 PROCEDURE — 99213 OFFICE O/P EST LOW 20 MIN: CPT | Mod: PBBFAC,PO | Performed by: INTERNAL MEDICINE

## 2018-10-31 RX ORDER — LEVOFLOXACIN 500 MG/1
TABLET, FILM COATED ORAL
Qty: 10 TABLET | Refills: 2 | Status: SHIPPED | OUTPATIENT
Start: 2018-10-31 | End: 2019-04-04 | Stop reason: SDUPTHER

## 2018-10-31 RX ORDER — HYDROCODONE BITARTRATE AND ACETAMINOPHEN 5; 325 MG/1; MG/1
1 TABLET ORAL EVERY 4 HOURS PRN
Qty: 60 TABLET | Refills: 0 | Status: SHIPPED | OUTPATIENT
Start: 2018-11-30 | End: 2019-01-04 | Stop reason: SDUPTHER

## 2018-10-31 RX ORDER — HYDROCODONE BITARTRATE AND ACETAMINOPHEN 5; 325 MG/1; MG/1
1 TABLET ORAL EVERY 4 HOURS PRN
Qty: 60 TABLET | Refills: 0 | Status: SHIPPED | OUTPATIENT
Start: 2018-11-30 | End: 2018-10-31 | Stop reason: SDUPTHER

## 2018-10-31 RX ORDER — HYDROCODONE BITARTRATE AND ACETAMINOPHEN 5; 325 MG/1; MG/1
1 TABLET ORAL EVERY 4 HOURS PRN
Qty: 60 TABLET | Refills: 0 | Status: SHIPPED | OUTPATIENT
Start: 2018-10-31 | End: 2019-01-04 | Stop reason: SDUPTHER

## 2018-10-31 RX ORDER — HYDROCODONE BITARTRATE AND ACETAMINOPHEN 5; 325 MG/1; MG/1
1 TABLET ORAL EVERY 4 HOURS PRN
Qty: 60 TABLET | Refills: 0 | Status: SHIPPED | OUTPATIENT
Start: 2018-10-31 | End: 2018-10-31 | Stop reason: SDUPTHER

## 2018-10-31 RX ORDER — LEVOFLOXACIN 500 MG/1
TABLET, FILM COATED ORAL
Qty: 10 TABLET | Refills: 2 | Status: SHIPPED | OUTPATIENT
Start: 2018-10-31 | End: 2018-10-31 | Stop reason: SDUPTHER

## 2018-10-31 NOTE — PATIENT INSTRUCTIONS
Stop Azithromycin    Use Levaquin 500 mg for 5 days for yellow green mucous  Can continue for up to 7 days if needed to clear secretions.    Continue Trelegy daily  Use Albuterol inhaler 2 puffs every 4 hours as needed for SOB and wheeze

## 2018-10-31 NOTE — PROGRESS NOTES
10/31/2018    Vazquez Vegas  Office Note    Chief Complaint   Patient presents with    Follow-up     3 month    COPD       HPI: 10/31/18 SOB-with exertion, worse at day, relieved by rest and Albuterol inhaler, albuterol 1x weekly, has had to stop fishing. Currently using treley daily.   States Trelegy is the best inhaler he has had.  2018- took abx but run out abx, uses norco for violent cough.  trelegy works great.  2018- having ivory mucous last 6 wks with violent cough, breathing ok, appetite ok  -  Feeling better, having dry nonproductive cough, no abx/sterois  2017, took tripak 3 days for 5 times and have had no problem.  Has been on digoxin for afib . Has had yellow mucous from chest with negative cultures.  Has spontneous pneumo 20 yrs ago, had tb at 38 yo.  Has had lung problems last 20 yrs with stagnant pattern. Mucous up to 1/2 cup a day.  2016HPI: now cleared lingering flare, cultures neg, hydrocodone- excellent cough suppression and has 12 left. No cough, or wheeze or sob.      The chief compliant  problem is new to me  PFSH:  Past Medical History:   Diagnosis Date    Arthritis     Finger Rt hand    Atrial fibrillation ,     Cardioversion X2  Dr. Cotres, Cardiologist    BPH (benign prostatic hyperplasia)     COPD (chronic obstructive pulmonary disease)     Hypertension     Kidney mass     Pneumothorax     25 years ago    TB (pulmonary tuberculosis)          Past Surgical History:   Procedure Laterality Date    colonscopy      EYE SURGERY      Bilateral cataracs      head surgery      growth removed    KIDNEY SURGERY      L kidney removed.     LUNG SURGERY      NEPHRECTOMY-RADICAL Left 2015    Performed by Venu Gonzales MD at [facility]    PROSTATE SURGERY      Varicele       Social History     Tobacco Use    Smoking status: Former Smoker     Last attempt to quit: 1969     Years since quittin.8     "Smokeless tobacco: Never Used   Substance Use Topics    Alcohol use: No    Drug use: No     Family History   Problem Relation Age of Onset    Kidney disease Father     Tuberculosis Father      Review of patient's allergies indicates:   Allergen Reactions    Hydrocodone Itching     I have reviewed past medical, family, and social history. I have reviewed previous nurse notes.    Performance Status:The patient's activity level is housebound activities.         Review of Systems   Constitutional: Negative for activity change, appetite change, chills, diaphoresis, fatigue, fever and unexpected weight change.   HENT: Negative for dental problem, postnasal drip, rhinorrhea, sinus pressure, sinus pain, sneezing, sore throat, trouble swallowing and voice change.    Respiratory: Negative for apnea, cough, chest tightness, shortness of breath, wheezing and stridor.    Cardiovascular: Negative for chest pain, palpitations and leg swelling.   Gastrointestinal: Negative for abdominal distention, abdominal pain, constipation and nausea.   Musculoskeletal: Negative for gait problem, myalgias and neck pain.   Skin: Negative for color change and pallor.   Allergic/Immunologic: Negative for environmental allergies and food allergies.   Neurological: Negative for dizziness, speech difficulty, weakness, light-headedness, numbness and headaches.   Hematological: Negative for adenopathy. Does not bruise/bleed easily.   Psychiatric/Behavioral: Negative for dysphoric mood and sleep disturbance. The patient is not nervous/anxious.           Exam:Comprehensive exam done. BP (!) 156/71 (BP Location: Right arm, Patient Position: Sitting)   Pulse 63   Ht 6' 2" (1.88 m)   Wt 93.9 kg (207 lb 0.2 oz)   SpO2 (!) 93% Comment: on room air  BMI 26.58 kg/m²   Exam included Vitals as listed  Constitutional: He is oriented to person, place, and time. He appears well-developed. No distress.   Nose: Nose normal.   Mouth/Throat: Uvula is midline, " oropharynx is clear and moist and mucous membranes are normal. No dental caries. No oropharyngeal exudate, posterior oropharyngeal edema, posterior oropharyngeal erythema or tonsillar abscesses.  Mallapatti (M) score  Eyes: Pupils are equal, round, and reactive to light.   Neck: No JVD present. No thyromegaly present.   Cardiovascular: Normal rate, regular rhythm and normal heart sounds. Exam reveals no gallop and no friction rub.   No murmur heard.  Pulmonary/Chest: Effort normal and breath sounds normal. No accessory muscle usage or stridor. No apnea and no tachypnea. No respiratory distress, decreased breath sounds, wheezes, rhonchi, rales, or tenderness.   Abdominal: Soft. He exhibits no mass. There is no tenderness. No hepatosplenomegaly, hernias and normoactive bowel sounds  Musculoskeletal: Normal range of motion. exhibits no edema.   Lymphadenopathy:     He has no cervical adenopathy.     He has no axillary adenopathy.   Neurological:  alert and oriented to person, place, and time. not disoriented.   Skin: Skin is warm and dry. Capillary refill takes less 2 sec. No cyanosis or erythema. No pallor. Nails show no clubbing.   Psychiatric: normal mood and affect. behavior is normal. Judgment and thought content normal.       Radiographs (ct chest and cxr) reviewed: results reviewed   EXAMINATION:  XR CHEST PA AND LATERAL    CLINICAL HISTORY:  Bronchiectasis, uncomplicated    TECHNIQUE:  PA and lateral views of the chest were performed.    COMPARISON:  9/21/2016    FINDINGS:  The cardiomediastinal silhouette is stable.  There is hyperinflation of the lungs.  There is mild elevation of left hemidiaphragm relative to the right.  There is bilateral apical pleural and parenchymal scarring more so on the left and mild cephalad retraction of the left hilum and there is mild chronic interstitial prominence particularly left lung base.  There are postoperative changes left hemithorax.  There is no new or confluent  infiltrate or pleural effusion.      Impression       No acute cardiopulmonary disease or significant oval change compared to the prior exam.      Electronically signed by: Janette Bryson MD  Date: 07/10/2018  Time: 10:12         Labs reviewed    [facility] follows labwork: reviewed    PFT was not completed          Plan:  Clinical impression is apparently straight forward and impression with management as below.    Vazquez was seen today for follow-up and copd.    Diagnoses and all orders for this visit:    Mixed type COPD (chronic obstructive pulmonary disease)  -     HYDROcodone-acetaminophen (NORCO) 5-325 mg per tablet; Take 1 tablet by mouth every 4 (four) hours as needed for Pain (cough).  -     HYDROcodone-acetaminophen (NORCO) 5-325 mg per tablet; Take 1 tablet by mouth every 4 (four) hours as needed for Pain (cough).    Chronic bronchitis, mucopurulent  -     Discontinue: levoFLOXacin (LEVAQUIN) 500 MG tablet; Take one pill a day for 5 days  -     HYDROcodone-acetaminophen (NORCO) 5-325 mg per tablet; Take 1 tablet by mouth every 4 (four) hours as needed for Pain (cough).  -     HYDROcodone-acetaminophen (NORCO) 5-325 mg per tablet; Take 1 tablet by mouth every 4 (four) hours as needed for Pain (cough).  -     levoFLOXacin (LEVAQUIN) 500 MG tablet; Take one pill a day for 5 days    Bronchiectasis without complication  -     Discontinue: levoFLOXacin (LEVAQUIN) 500 MG tablet; Take one pill a day for 5 days  -     levoFLOXacin (LEVAQUIN) 500 MG tablet; Take one pill a day for 5 days    COPD exacerbation  -     Discontinue: levoFLOXacin (LEVAQUIN) 500 MG tablet; Take one pill a day for 5 days  -     HYDROcodone-acetaminophen (NORCO) 5-325 mg per tablet; Take 1 tablet by mouth every 4 (four) hours as needed for Pain (cough).  -     HYDROcodone-acetaminophen (NORCO) 5-325 mg per tablet; Take 1 tablet by mouth every 4 (four) hours as needed for Pain (cough).  -     levoFLOXacin (LEVAQUIN) 500 MG tablet; Take one pill a  day for 5 days    Acute right-sided low back pain with right-sided sciatica  -     HYDROcodone-acetaminophen (NORCO) 5-325 mg per tablet; Take 1 tablet by mouth every 4 (four) hours as needed for Pain (cough).  -     HYDROcodone-acetaminophen (NORCO) 5-325 mg per tablet; Take 1 tablet by mouth every 4 (four) hours as needed for Pain (cough).        Follow-up in about 3 months (around 1/31/2019), or if symptoms worsen or fail to improve.    Discussed with patient above for education the following:      Patient Instructions   Stop Azithromycin    Use Levaquin 500 mg for 5 days for yellow green mucous  Can continue for up to 7 days if needed to clear secretions.    Continue Trelegy daily  Use Albuterol inhaler 2 puffs every 4 hours as needed for SOB and wheeze

## 2019-01-04 ENCOUNTER — OFFICE VISIT (OUTPATIENT)
Dept: PULMONOLOGY | Facility: CLINIC | Age: 84
End: 2019-01-04
Payer: MEDICARE

## 2019-01-04 VITALS
WEIGHT: 198 LBS | BODY MASS INDEX: 25.41 KG/M2 | DIASTOLIC BLOOD PRESSURE: 72 MMHG | HEIGHT: 74 IN | OXYGEN SATURATION: 97 % | SYSTOLIC BLOOD PRESSURE: 145 MMHG | HEART RATE: 73 BPM

## 2019-01-04 DIAGNOSIS — J44.9 MIXED TYPE COPD (CHRONIC OBSTRUCTIVE PULMONARY DISEASE): ICD-10-CM

## 2019-01-04 DIAGNOSIS — J41.1 CHRONIC BRONCHITIS, MUCOPURULENT: ICD-10-CM

## 2019-01-04 DIAGNOSIS — J44.1 COPD EXACERBATION: ICD-10-CM

## 2019-01-04 DIAGNOSIS — M54.40 LOW BACK PAIN WITH SCIATICA, SCIATICA LATERALITY UNSPECIFIED, UNSPECIFIED BACK PAIN LATERALITY, UNSPECIFIED CHRONICITY: Primary | ICD-10-CM

## 2019-01-04 DIAGNOSIS — J47.9 BRONCHIECTASIS WITHOUT COMPLICATION: ICD-10-CM

## 2019-01-04 DIAGNOSIS — M54.41 ACUTE RIGHT-SIDED LOW BACK PAIN WITH RIGHT-SIDED SCIATICA: ICD-10-CM

## 2019-01-04 PROCEDURE — 99999 PR PBB SHADOW E&M-EST. PATIENT-LVL III: ICD-10-PCS | Mod: PBBFAC,,, | Performed by: NURSE PRACTITIONER

## 2019-01-04 PROCEDURE — 99999 PR PBB SHADOW E&M-EST. PATIENT-LVL III: CPT | Mod: PBBFAC,,, | Performed by: NURSE PRACTITIONER

## 2019-01-04 PROCEDURE — 99213 OFFICE O/P EST LOW 20 MIN: CPT | Mod: S$PBB,,, | Performed by: NURSE PRACTITIONER

## 2019-01-04 PROCEDURE — 99213 PR OFFICE/OUTPT VISIT, EST, LEVL III, 20-29 MIN: ICD-10-PCS | Mod: S$PBB,,, | Performed by: NURSE PRACTITIONER

## 2019-01-04 PROCEDURE — 99213 OFFICE O/P EST LOW 20 MIN: CPT | Mod: PBBFAC,PO | Performed by: NURSE PRACTITIONER

## 2019-01-04 RX ORDER — ALBUTEROL SULFATE 90 UG/1
1-2 AEROSOL, METERED RESPIRATORY (INHALATION) EVERY 4 HOURS PRN
Qty: 3 INHALER | Refills: 3 | Status: SHIPPED | OUTPATIENT
Start: 2019-01-04 | End: 2019-10-15 | Stop reason: SDUPTHER

## 2019-01-04 RX ORDER — HYDROCODONE BITARTRATE AND ACETAMINOPHEN 5; 325 MG/1; MG/1
1 TABLET ORAL EVERY 4 HOURS PRN
Qty: 60 TABLET | Refills: 0 | Status: SHIPPED | OUTPATIENT
Start: 2019-03-04 | End: 2019-04-15 | Stop reason: SDUPTHER

## 2019-01-04 RX ORDER — HYDROCODONE BITARTRATE AND ACETAMINOPHEN 5; 325 MG/1; MG/1
1 TABLET ORAL EVERY 4 HOURS PRN
Qty: 60 TABLET | Refills: 0 | Status: SHIPPED | OUTPATIENT
Start: 2019-01-04 | End: 2019-04-15 | Stop reason: SDUPTHER

## 2019-01-04 RX ORDER — HYDROCODONE BITARTRATE AND ACETAMINOPHEN 5; 325 MG/1; MG/1
1 TABLET ORAL EVERY 4 HOURS PRN
Qty: 60 TABLET | Refills: 0 | Status: SHIPPED | OUTPATIENT
Start: 2019-02-04 | End: 2019-04-15 | Stop reason: SDUPTHER

## 2019-01-04 NOTE — PATIENT INSTRUCTIONS
Continue current medications    Refill for [place] for cough suppression sent to Dr. Mendez to review     Please have pulmonary function test completed before next appointment.    Sputum cultures for when cough becomes productive.

## 2019-01-04 NOTE — PROGRESS NOTES
1/4/2019    Vazquez Vegas  Office Note    Chief Complaint   Patient presents with    Follow-up     2 month. very nauseated today    Medication Management     refills on rescue inhaler and cough medicine       HPI:   Addendum 2/28/2019- pt call stating back pain sciatic involvement not improved since last appointment. Requesting referral to orthopedics.    Jan 4, 2010 - had had up to 1/2 cup mucous/d.   H/o bronchiectiectasis. 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   Back pain limits activity. States no current cough. Using Trelegy daily. States improved breathing.  Pt states cough controlled with Norco only with Bronchitis exacerbation. 1-2 pills daily. Last used in week prior.    10/31/18 SOB-with exertion, worse at day, relieved by rest and Albuterol inhaler, albuterol 1x weekly, has had to stop fishing. Currently using treley daily.   States Trelegy is the best inhaler he has had.    Patient Instructions   Stop Azithromycin    Use Levaquin 500 mg for 5 days for yellow green mucous  Can continue for up to 7 days if needed to clear secretions.    Continue Trelegy daily  Use Albuterol inhaler 2 puffs every 4 hours as needed for SOB and wheeze        July 23, 2018- took abx but run out abx, uses norco for violent cough.  trelegy works great.  Jan 25, 2018- having ivory mucous last 6 wks with violent cough, breathing ok, appetite ok  July 25,2017-  Feeling better, having dry nonproductive cough, no abx/sterois  April 20, 2017, took tripak 3 days for 5 times and have had no problem.  Has been on digoxin for afib . Has had yellow mucous from chest with negative cultures.  Has spontneous pneumo 20 yrs ago, had tb at 40 yo.  Has had lung problems last 20 yrs with stagnant pattern. Mucous up to 1/2 cup a day.  Nov 22, 2016HPI: now cleared lingering flare, cultures neg, hydrocodone- excellent cough suppression and has 12 left. No cough, or wheeze or sob.      The chief  compliant  problem is new to me  PFSH:  Past Medical History:   Diagnosis Date    Arthritis     Finger Rt hand    Atrial fibrillation ,     Cardioversion X2  Dr. Cortes, Cardiologist    BPH (benign prostatic hyperplasia)     COPD (chronic obstructive pulmonary disease)     Hypertension     Kidney mass     Pneumothorax     25 years ago    TB (pulmonary tuberculosis)          Past Surgical History:   Procedure Laterality Date    colonscopy      EYE SURGERY      Bilateral cataracs      head surgery      growth removed    KIDNEY SURGERY      L kidney removed.     LUNG SURGERY      NEPHRECTOMY-RADICAL Left 2015    [facility]    PROSTATE SURGERY      Varicele       Social History     Tobacco Use    Smoking status: Former Smoker     Last attempt to quit: 1969     Years since quittin.0    Smokeless tobacco: Never Used   Substance Use Topics    Alcohol use: No    Drug use: No     Family History   Problem Relation Age of Onset    Kidney disease Father     Tuberculosis Father      Review of patient's allergies indicates:   Allergen Reactions    Hydrocodone Itching     I have reviewed past medical, family, and social history. I have reviewed previous nurse notes.    Performance Status:The patient's activity level is housebound activities.         Review of Systems   Constitutional: Negative for activity change, appetite change, chills, diaphoresis, fatigue, fever and unexpected weight change.   HENT: Negative for dental problem, postnasal drip, rhinorrhea, sinus pressure, sinus pain, sneezing, sore throat, trouble swallowing and voice change.    Respiratory: Negative for apnea, cough, chest tightness, shortness of breath, wheezing and stridor.    Cardiovascular: Negative for chest pain, palpitations and leg swelling.   Gastrointestinal: Negative for abdominal distention, abdominal pain, constipation and nausea.   Musculoskeletal: Negative  "for gait problem, myalgias and neck pain.   Skin: Negative for color change and pallor.   Allergic/Immunologic: Negative for environmental allergies and food allergies.   Neurological: Negative for dizziness, speech difficulty, weakness, light-headedness, numbness and headaches.   Hematological: Negative for adenopathy. Does not bruise/bleed easily.   Psychiatric/Behavioral: Negative for dysphoric mood and sleep disturbance. The patient is not nervous/anxious.           Exam:Comprehensive exam done. BP (!) 145/72 (BP Location: Left arm, Patient Position: Sitting)   Pulse 73   Ht 6' 2" (1.88 m)   Wt 89.8 kg (197 lb 15.6 oz)   SpO2 97% Comment: on room air  BMI 25.42 kg/m²   Exam included Vitals as listed  Constitutional: He is oriented to person, place, and time. He appears well-developed. No distress.   Nose: Nose normal.   Mouth/Throat: Uvula is midline, oropharynx is clear and moist and mucous membranes are normal. No dental caries. No oropharyngeal exudate, posterior oropharyngeal edema, posterior oropharyngeal erythema or tonsillar abscesses.  Mallapatti (M) score 3  Eyes: Pupils are equal, round, and reactive to light.   Neck: No JVD present. No thyromegaly present.   Cardiovascular: Normal rate, regular rhythm and normal heart sounds. Exam reveals no gallop and no friction rub.   No murmur heard.  Pulmonary/Chest: Effort normal and breath sounds normal. No accessory muscle usage or stridor. No apnea and no tachypnea. No respiratory distress, decreased breath sounds, wheezes, rhonchi, rales, or tenderness.   Abdominal: Soft. He exhibits no mass. There is no tenderness. No hepatosplenomegaly, hernias and normoactive bowel sounds  Musculoskeletal: Normal range of motion. exhibits no edema.   Lymphadenopathy:     He has no cervical adenopathy.     He has no axillary adenopathy.   Neurological:  alert and oriented to person, place, and time. not disoriented.   Skin: Skin is warm and dry. Capillary refill takes " less 2 sec. No cyanosis or erythema. No pallor. Nails show no clubbing.   Psychiatric: normal mood and affect. behavior is normal. Judgment and thought content normal.       Radiographs (ct chest and cxr) reviewed: results reviewed   EXAMINATION:  XR CHEST PA AND LATERAL    CLINICAL HISTORY:  Bronchiectasis, uncomplicated    TECHNIQUE:  PA and lateral views of the chest were performed.    COMPARISON:  9/21/2016    FINDINGS:  The cardiomediastinal silhouette is stable.  There is hyperinflation of the lungs.  There is mild elevation of left hemidiaphragm relative to the right.  There is bilateral apical pleural and parenchymal scarring more so on the left and mild cephalad retraction of the left hilum and there is mild chronic interstitial prominence particularly left lung base.  There are postoperative changes left hemithorax.  There is no new or confluent infiltrate or pleural effusion.      Impression       No acute cardiopulmonary disease or significant oval change compared to the prior exam.      Electronically signed by: Janette Bryson MD  Date: 07/10/2018  Time: 10:12         Labs reviewed    [facility] follows labwork: reviewed    PFT ordered and will be reviewed      Plan:  Clinical impression is apparently straight forward and impression with management as below.    Vazquez was seen today for follow-up and medication management.    Diagnoses and all orders for this visit:    Mixed type COPD (chronic obstructive pulmonary disease)  -     Culture, Respiratory with Gram Stain; Future  -     Complete PFT with bronchodilator; Future  -     PROAIR HFA 90 mcg/actuation inhaler; Inhale 1-2 puffs into the lungs every 4 (four) hours as needed for Wheezing.  -     HYDROcodone-acetaminophen (NORCO) 5-325 mg per tablet; Take 1 tablet by mouth every 4 (four) hours as needed for Pain (cough).  -     HYDROcodone-acetaminophen (NORCO) 5-325 mg per tablet; Take 1 tablet by mouth every 4 (four) hours as needed for Pain  (cough).    Bronchiectasis without complication  -     Culture, Respiratory with Gram Stain; Future  -     Complete PFT with bronchodilator; Future  -     PROAIR HFA 90 mcg/actuation inhaler; Inhale 1-2 puffs into the lungs every 4 (four) hours as needed for Wheezing.    COPD exacerbation  -     PROAIR HFA 90 mcg/actuation inhaler; Inhale 1-2 puffs into the lungs every 4 (four) hours as needed for Wheezing.  -     HYDROcodone-acetaminophen (NORCO) 5-325 mg per tablet; Take 1 tablet by mouth every 4 (four) hours as needed for Pain (cough).  -     HYDROcodone-acetaminophen (NORCO) 5-325 mg per tablet; Take 1 tablet by mouth every 4 (four) hours as needed for Pain (cough).    Chronic bronchitis, mucopurulent  -     PROAIR HFA 90 mcg/actuation inhaler; Inhale 1-2 puffs into the lungs every 4 (four) hours as needed for Wheezing.  -     HYDROcodone-acetaminophen (NORCO) 5-325 mg per tablet; Take 1 tablet by mouth every 4 (four) hours as needed for Pain (cough).  -     HYDROcodone-acetaminophen (NORCO) 5-325 mg per tablet; Take 1 tablet by mouth every 4 (four) hours as needed for Pain (cough).    Acute right-sided low back pain with right-sided sciatica  -     HYDROcodone-acetaminophen (NORCO) 5-325 mg per tablet; Take 1 tablet by mouth every 4 (four) hours as needed for Pain (cough).  -     HYDROcodone-acetaminophen (NORCO) 5-325 mg per tablet; Take 1 tablet by mouth every 4 (four) hours as needed for Pain (cough).    Other orders  -     HYDROcodone-acetaminophen (NORCO) 5-325 mg per tablet; Take 1 tablet by mouth every 4 (four) hours as needed for Pain.        Follow-up in about 3 months (around 4/4/2019).    Discussed with patient above for education the following:      Patient Instructions   Continue current medications    Refill for [place] for cough suppression sent to Dr. Mendez to review     Please have pulmonary function test completed before next appointment.    Sputum cultures for when cough becomes productive.

## 2019-02-28 ENCOUNTER — TELEPHONE (OUTPATIENT)
Dept: PULMONOLOGY | Facility: CLINIC | Age: 84
End: 2019-02-28

## 2019-02-28 NOTE — TELEPHONE ENCOUNTER
Message sent to NP    ----- Message from Veronica James sent at 2/28/2019 10:32 AM CST -----  Contact: self  Patient need to speak to nurse regarding patient will like referral to orthopedic specialist  name Dr. Jose Alejandro fax number is [phone] and phone is [phone] per patient       Please call to advice [phone] (home) patient will like to know when or if referral is sent

## 2019-03-01 ENCOUNTER — TELEPHONE (OUTPATIENT)
Dept: PULMONOLOGY | Facility: CLINIC | Age: 84
End: 2019-03-01

## 2019-03-01 NOTE — TELEPHONE ENCOUNTER
Notified pt I faxed referral to the number listed on the previous call Dr. Alejandro fax: [phone]    Pt asked for  [facility] script from dr russo, notified pt that earliest fill date for next norco script is 3/4/19    ----- Message from Suma Cárdenas NP sent at 2/28/2019  5:00 PM CST -----  Regarding: RE: REFERRAL  I ordered the referral please fax to his doctor of choice.  ----- Message -----  From: Sneha Hawkins LPN  Sent: 2/28/2019  11:33 AM  To: Suma Cárdenas NP  Subject: REFERRAL                                         Patient is requesting a referral to orthopedic specialist  name Dr. Jose Alejandro. Last seen pt on 1/4/19 with you. If referral is done please let me know so I can call the pt.

## 2019-04-04 ENCOUNTER — OFFICE VISIT (OUTPATIENT)
Dept: PULMONOLOGY | Facility: CLINIC | Age: 84
End: 2019-04-04
Payer: MEDICARE

## 2019-04-04 VITALS
HEART RATE: 75 BPM | OXYGEN SATURATION: 96 % | SYSTOLIC BLOOD PRESSURE: 149 MMHG | DIASTOLIC BLOOD PRESSURE: 78 MMHG | WEIGHT: 200.38 LBS | BODY MASS INDEX: 25.72 KG/M2 | HEIGHT: 74 IN

## 2019-04-04 DIAGNOSIS — J41.1 CHRONIC BRONCHITIS, MUCOPURULENT: ICD-10-CM

## 2019-04-04 DIAGNOSIS — J44.1 COPD EXACERBATION: ICD-10-CM

## 2019-04-04 DIAGNOSIS — M25.562 ACUTE PAIN OF LEFT KNEE: Primary | ICD-10-CM

## 2019-04-04 DIAGNOSIS — J47.9 BRONCHIECTASIS WITHOUT COMPLICATION: ICD-10-CM

## 2019-04-04 PROCEDURE — 99999 PR PBB SHADOW E&M-EST. PATIENT-LVL III: CPT | Mod: PBBFAC,,, | Performed by: NURSE PRACTITIONER

## 2019-04-04 PROCEDURE — 99213 OFFICE O/P EST LOW 20 MIN: CPT | Mod: S$PBB,,, | Performed by: NURSE PRACTITIONER

## 2019-04-04 PROCEDURE — 99213 OFFICE O/P EST LOW 20 MIN: CPT | Mod: PBBFAC,PO | Performed by: NURSE PRACTITIONER

## 2019-04-04 PROCEDURE — 99999 PR PBB SHADOW E&M-EST. PATIENT-LVL III: ICD-10-PCS | Mod: PBBFAC,,, | Performed by: NURSE PRACTITIONER

## 2019-04-04 PROCEDURE — 99213 PR OFFICE/OUTPT VISIT, EST, LEVL III, 20-29 MIN: ICD-10-PCS | Mod: S$PBB,,, | Performed by: NURSE PRACTITIONER

## 2019-04-04 RX ORDER — LEVOFLOXACIN 500 MG/1
TABLET, FILM COATED ORAL
Qty: 10 TABLET | Refills: 2 | Status: SHIPPED | OUTPATIENT
Start: 2019-04-04 | End: 2019-07-16 | Stop reason: SDUPTHER

## 2019-04-04 RX ORDER — HYDROCODONE BITARTRATE AND ACETAMINOPHEN 5; 325 MG/1; MG/1
1 TABLET ORAL EVERY 4 HOURS PRN
Qty: 42 TABLET | Refills: 0 | Status: SHIPPED | OUTPATIENT
Start: 2019-04-04 | End: 2019-04-11

## 2019-04-04 RX ORDER — HYDROCODONE BITARTRATE AND ACETAMINOPHEN 5; 325 MG/1; MG/1
1 TABLET ORAL EVERY 4 HOURS PRN
Qty: 42 TABLET | Refills: 0 | Status: SHIPPED | OUTPATIENT
Start: 2019-04-04 | End: 2019-04-04 | Stop reason: SDUPTHER

## 2019-04-04 RX ORDER — DABIGATRAN ETEXILATE MESYLATE 75 MG/1
75 CAPSULE ORAL 2 TIMES DAILY
COMMUNITY
Start: 2019-03-18 | End: 2020-05-19

## 2019-04-04 NOTE — PROGRESS NOTES
4/4/2019    Shukla Berto [facility]  Office Note    Chief Complaint   Patient presents with    Shortness of Breath    Cough     dry        HPI:   April 4, 2019- major complaint left knee pain, pain 8 on 0-10 scale, onset 6 weeks, no improvement, took Norco 5-325 with slight improvement able to walk. SOB with exertion, cough nonproductive, worse during day.   Appointment Dr. Alejandro orthopedic surgeon scheduled 4/9/19.     Addendum 2/28/2019- pt call stating back pain sciatic involvement not improved since last appointment. Requesting referral to orthopedics.    Jan 4, 2010 - had had up to 1/2 cup mucous/d.   H/o bronchiectiectasis. 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   Back pain limits activity. States no current cough. Using Trelegy daily. States improved breathing.  Pt states cough controlled with Norco only with Bronchitis exacerbation. 1-2 pills daily. Last used in week prior.    10/31/18 SOB-with exertion, worse at day, relieved by rest and Albuterol inhaler, albuterol 1x weekly, has had to stop fishing. Currently using treley daily.   States Trelegy is the best inhaler he has had.    Patient Instructions   Stop Azithromycin    Use Levaquin 500 mg for 5 days for yellow green mucous  Can continue for up to 7 days if needed to clear secretions.    Continue Trelegy daily  Use Albuterol inhaler 2 puffs every 4 hours as needed for SOB and wheeze        July 23, 2018- took abx but run out abx, uses norco for violent cough.  trelegy works great.  Jan 25, 2018- having ivory mucous last 6 wks with violent cough, breathing ok, appetite ok  July 25,2017-  Feeling better, having dry nonproductive cough, no abx/sterois  April 20, 2017, took tripak 3 days for 5 times and have had no problem.  Has been on digoxin for afib . Has had yellow mucous from chest with negative cultures.  Has spontneous pneumo 20 yrs ago, had tb at 38 yo.  Has had lung problems last 20 yrs with stagnant  pattern. Mucous up to 1/2 cup a day.  2016HPI: now cleared lingering flare, cultures neg, hydrocodone- excellent cough suppression and has 12 left. No cough, or wheeze or sob.      The chief compliant  problem is worsening  PFSH:  Past Medical History:   Diagnosis Date    Arthritis     Finger Rt hand    Atrial fibrillation ,     Cardioversion X2  Dr. Cortes, Cardiologist    BPH (benign prostatic hyperplasia)     COPD (chronic obstructive pulmonary disease)     Hypertension     Kidney mass     Pneumothorax     25 years ago    TB (pulmonary tuberculosis)          Past Surgical History:   Procedure Laterality Date    colonscopy      EYE SURGERY      Bilateral cataracs      head surgery      growth removed    KIDNEY SURGERY      L kidney removed.     LUNG SURGERY      NEPHRECTOMY-RADICAL Left 2015    Performed by [facility] at [facility]    PROSTATE SURGERY      Varicele       Social History     Tobacco Use    Smoking status: Former Smoker     Last attempt to quit: 1969     Years since quittin.2    Smokeless tobacco: Never Used   Substance Use Topics    Alcohol use: No    Drug use: No     Family History   Problem Relation Age of Onset    Kidney disease Father     Tuberculosis Father      Review of patient's allergies indicates:   Allergen Reactions    Hydrocodone Itching     I have reviewed past medical, family, and social history. I have reviewed previous nurse notes.    Performance Status:The patient's activity level is housebound activities.         Review of Systems   Constitutional: Negative for activity change, appetite change, chills, diaphoresis, fatigue, fever and unexpected weight change.   HENT: Negative for dental problem, postnasal drip, rhinorrhea, sinus pressure, sinus pain, sneezing, sore throat, trouble swallowing and voice change.    Respiratory: Negative for apnea, chest tightness, shortness of breath, wheezing and stridor.  Positive  "for cough  Cardiovascular: Negative for chest pain, palpitations and leg swelling.   Gastrointestinal: Negative for abdominal distention, abdominal pain, constipation and nausea.   Musculoskeletal: Negative for gait problem and neck pain.Positive for lower back and left knee pain, sharp   Skin: Negative for color change and pallor.   Allergic/Immunologic: Negative for environmental allergies and food allergies.   Neurological: Negative for dizziness, speech difficulty, weakness, light-headedness, numbness and headaches.   Hematological: Negative for adenopathy. Does not bruise/bleed easily.   Psychiatric/Behavioral: Negative for dysphoric mood and sleep disturbance. The patient is not nervous/anxious.           Exam:Comprehensive exam done. BP (!) 149/78 (BP Location: Right arm, Patient Position: Sitting)   Pulse 75   Ht 6' 2" (1.88 m)   Wt 90.9 kg (200 lb 6.4 oz)   SpO2 96% Comment: on room air  BMI 25.73 kg/m²   Exam included Vitals as listed  Constitutional: He is oriented to person, place, and time. He appears well-developed. No distress.   Nose: Nose normal.   Mouth/Throat: Uvula is midline, oropharynx is clear and moist and mucous membranes are normal. No dental caries. No oropharyngeal exudate, posterior oropharyngeal edema, posterior oropharyngeal erythema or tonsillar abscesses.  Mallapatti (M) score 3  Eyes: Pupils are equal, round, and reactive to light.   Neck: No JVD present. No thyromegaly present.   Cardiovascular: Normal rate, regular rhythm and normal heart sounds. Exam reveals no gallop and no friction rub.   No murmur heard.  Pulmonary/Chest: Effort normal and breath sounds normal. No accessory muscle usage or stridor. No apnea and no tachypnea. No respiratory distress, decreased breath sounds, wheezes, rhonchi, rales, or tenderness.   Abdominal: Soft. He exhibits no mass. There is no tenderness. No hepatosplenomegaly, hernias and normoactive bowel sounds  Musculoskeletal: Normal range of " motion. exhibits no edema.   Lymphadenopathy:     He has no cervical adenopathy.     He has no axillary adenopathy.   Neurological:  alert and oriented to person, place, and time. not disoriented.   Skin: Skin is warm and dry. Capillary refill takes less 2 sec. No cyanosis or erythema. No pallor. Nails show no clubbing.   Psychiatric: normal mood and affect. behavior is normal. Judgment and thought content normal.       Radiographs (ct chest and cxr) reviewed: results reviewed   EXAMINATION:  XR CHEST PA AND LATERAL    CLINICAL HISTORY:  Bronchiectasis, uncomplicated    TECHNIQUE:  PA and lateral views of the chest were performed.    COMPARISON:  9/21/2016    FINDINGS:  The cardiomediastinal silhouette is stable.  There is hyperinflation of the lungs.  There is mild elevation of left hemidiaphragm relative to the right.  There is bilateral apical pleural and parenchymal scarring more so on the left and mild cephalad retraction of the left hilum and there is mild chronic interstitial prominence particularly left lung base.  There are postoperative changes left hemithorax.  There is no new or confluent infiltrate or pleural effusion.      Impression       No acute cardiopulmonary disease or significant oval change compared to the prior exam.      Electronically signed by: Janette Bryson MD  Date: 07/10/2018  Time: 10:12         Labs reviewed    [facility] follows labwork: reviewed    PFT ordered and will be reviewed      Plan:  Clinical impression is apparently straight forward and impression with management as below.    Vazquez was seen today for shortness of breath and cough.    Diagnoses and all orders for this visit:    Acute pain of left knee  -     HYDROcodone-acetaminophen (NORCO) 5-325 mg per tablet; Take 1 tablet by mouth every 4 (four) hours as needed for Pain.    Bronchiectasis without complication  -     levoFLOXacin (LEVAQUIN) 500 MG tablet; Take one pill a day for 5 days    Chronic bronchitis, mucopurulent  -      levoFLOXacin (LEVAQUIN) 500 MG tablet; Take one pill a day for 5 days    COPD exacerbation  -     levoFLOXacin (LEVAQUIN) 500 MG tablet; Take one pill a day for 5 days        Follow up in about 1 week (around 4/11/2019), or if symptoms worsen or fail to improve.    Discussed with patient above for education the following:      Patient Instructions   Sciatic nerve is acutely inflamed. Will order Norco 5 every 4 hours for 7 days, will get you to your appointment with Orthopedic surgeon    Continue current COPD therapy.

## 2019-04-04 NOTE — PATIENT INSTRUCTIONS
Sciatic nerve is acutely inflamed. Will order Norco 5 every 4 hours for 7 days, will get you to your appointment with Orthopedic surgeon    Continue current COPD therapy.

## 2019-04-15 ENCOUNTER — OFFICE VISIT (OUTPATIENT)
Dept: PULMONOLOGY | Facility: CLINIC | Age: 84
End: 2019-04-15
Payer: MEDICARE

## 2019-04-15 VITALS
BODY MASS INDEX: 29.61 KG/M2 | HEIGHT: 69 IN | SYSTOLIC BLOOD PRESSURE: 163 MMHG | DIASTOLIC BLOOD PRESSURE: 76 MMHG | OXYGEN SATURATION: 96 % | HEART RATE: 55 BPM | WEIGHT: 199.94 LBS

## 2019-04-15 DIAGNOSIS — J44.1 COPD EXACERBATION: ICD-10-CM

## 2019-04-15 DIAGNOSIS — J41.1 CHRONIC BRONCHITIS, MUCOPURULENT: ICD-10-CM

## 2019-04-15 DIAGNOSIS — J47.9 BRONCHIECTASIS WITHOUT COMPLICATION: Primary | ICD-10-CM

## 2019-04-15 DIAGNOSIS — J44.9 MIXED TYPE COPD (CHRONIC OBSTRUCTIVE PULMONARY DISEASE): ICD-10-CM

## 2019-04-15 DIAGNOSIS — M54.41 ACUTE RIGHT-SIDED LOW BACK PAIN WITH RIGHT-SIDED SCIATICA: ICD-10-CM

## 2019-04-15 PROCEDURE — 99214 OFFICE O/P EST MOD 30 MIN: CPT | Mod: PBBFAC,PO | Performed by: INTERNAL MEDICINE

## 2019-04-15 PROCEDURE — 99999 PR PBB SHADOW E&M-EST. PATIENT-LVL IV: ICD-10-PCS | Mod: PBBFAC,,, | Performed by: INTERNAL MEDICINE

## 2019-04-15 PROCEDURE — 99213 PR OFFICE/OUTPT VISIT, EST, LEVL III, 20-29 MIN: ICD-10-PCS | Mod: S$PBB,,, | Performed by: INTERNAL MEDICINE

## 2019-04-15 PROCEDURE — 99999 PR PBB SHADOW E&M-EST. PATIENT-LVL IV: CPT | Mod: PBBFAC,,, | Performed by: INTERNAL MEDICINE

## 2019-04-15 PROCEDURE — 99213 OFFICE O/P EST LOW 20 MIN: CPT | Mod: S$PBB,,, | Performed by: INTERNAL MEDICINE

## 2019-04-15 RX ORDER — HYDROCODONE BITARTRATE AND ACETAMINOPHEN 5; 325 MG/1; MG/1
1 TABLET ORAL EVERY 4 HOURS PRN
Qty: 60 TABLET | Refills: 0 | Status: SHIPPED | OUTPATIENT
Start: 2019-04-15 | End: 2019-04-15

## 2019-04-15 RX ORDER — HYDROCODONE BITARTRATE AND ACETAMINOPHEN 5; 325 MG/1; MG/1
1 TABLET ORAL EVERY 4 HOURS PRN
Qty: 60 TABLET | Refills: 0 | Status: SHIPPED | OUTPATIENT
Start: 2019-04-15 | End: 2019-08-10 | Stop reason: CLARIF

## 2019-04-15 RX ORDER — HYDROCODONE BITARTRATE AND ACETAMINOPHEN 5; 325 MG/1; MG/1
1 TABLET ORAL EVERY 4 HOURS PRN
Qty: 65 TABLET | Refills: 0 | Status: SHIPPED | OUTPATIENT
Start: 2019-04-15 | End: 2019-07-16 | Stop reason: SDUPTHER

## 2019-04-15 RX ORDER — HYDROCODONE BITARTRATE AND ACETAMINOPHEN 5; 325 MG/1; MG/1
1 TABLET ORAL EVERY 4 HOURS PRN
Qty: 60 TABLET | Refills: 0 | Status: SHIPPED | OUTPATIENT
Start: 2019-06-15 | End: 2019-08-10 | Stop reason: CLARIF

## 2019-04-15 NOTE — PROGRESS NOTES
4/15/2019    Shukla Berto [facility]enc[facility]  Office Note    Chief Complaint   Patient presents with    medication refills       HPI:   April 15, 2019- seeing Dr Alejandro [place] - dx sciatiac.      April 4, 2019- major complaint left knee pain, pain 8 on 0-10 scale, onset 6 weeks, no improvement, took Norco 5-325 with slight improvement able to walk. SOB with exertion, cough nonproductive, worse during day.   Appointment Dr. Alejandro orthopedic surgeon scheduled 4/9/19.     Addendum 2/28/2019- pt call stating back pain sciatic involvement not improved since last appointment. Requesting referral to orthopedics.  Patient Instructions   Sciatic nerve is acutely inflamed. Will order Norco 5 every 4 hours for 7 days, will get you to your appointment with Orthopedic surgeon    Continue current COPD therapy.  Jan 4, 2010 - had had up to 1/2 cup mucous/d.   H/o bronchiectiectasis. 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   Back pain limits activity. States no current cough. Using Trelegy daily. States improved breathing.  Pt states cough controlled with Norco only with Bronchitis exacerbation. 1-2 pills daily. Last used in week prior.    10/31/18 SOB-with exertion, worse at day, relieved by rest and Albuterol inhaler, albuterol 1x weekly, has had to stop fishing. Currently using treley daily.   States Trelegy is the best inhaler he has had.    Patient Instructions   Stop Azithromycin    Use Levaquin 500 mg for 5 days for yellow green mucous  Can continue for up to 7 days if needed to clear secretions.    Continue Trelegy daily  Use Albuterol inhaler 2 puffs every 4 hours as needed for SOB and wheeze        July 23, 2018- took abx but run out abx, uses norco for violent cough.  trelegy works great.  Jan 25, 2018- having ivory mucous last 6 wks with violent cough, breathing ok, appetite ok  July 25,2017-  Feeling better, having dry nonproductive cough, no abx/sterois  April 20, 2017, took  tripak 3 days for 5 times and have had no problem.  Has been on digoxin for afib . Has had yellow mucous from chest with negative cultures.  Has spontneous pneumo 20 yrs ago, had tb at 38 yo.  Has had lung problems last 20 yrs with stagnant pattern. Mucous up to 1/2 cup a day.  2016HPI: now cleared lingering flare, cultures neg, hydrocodone- excellent cough suppression and has 12 left. No cough, or wheeze or sob.      The chief compliant  problem is worsening  PFSH:  Past Medical History:   Diagnosis Date    Arthritis     Finger Rt hand    Atrial fibrillation ,     Cardioversion X2  Dr. Cortes, Cardiologist    BPH (benign prostatic hyperplasia)     COPD (chronic obstructive pulmonary disease)     Hypertension     Kidney mass     Pneumothorax     25 years ago    TB (pulmonary tuberculosis)          Past Surgical History:   Procedure Laterality Date    colonscopy      EYE SURGERY      Bilateral cataracs      head surgery      growth removed    KIDNEY SURGERY      L kidney removed.     LUNG SURGERY      NEPHRECTOMY-RADICAL Left 2015    Performed by [facility] at [facility] OR    PROSTATE SURGERY      Varicele       Social History     Tobacco Use    Smoking status: Former Smoker     Last attempt to quit: 1969     Years since quittin.3    Smokeless tobacco: Never Used   Substance Use Topics    Alcohol use: No    Drug use: No     Family History   Problem Relation Age of Onset    Kidney disease Father     Tuberculosis Father      Review of patient's allergies indicates:   Allergen Reactions    Hydrocodone Itching     I have reviewed past medical, family, and social history. I have reviewed previous nurse notes.    Performance Status:The patient's activity level is housebound activities.         Review of Systems   Constitutional: Negative for activity change, appetite change, chills, diaphoresis, fatigue, fever and unexpected weight change.   HENT: Negative  "for dental problem, postnasal drip, rhinorrhea, sinus pressure, sinus pain, sneezing, sore throat, trouble swallowing and voice change.    Respiratory: Negative for apnea, chest tightness, shortness of breath, wheezing and stridor.  Positive for cough  Cardiovascular: Negative for chest pain, palpitations and leg swelling.   Gastrointestinal: Negative for abdominal distention, abdominal pain, constipation and nausea.   Musculoskeletal: Negative for gait problem and neck pain.Positive for lower back and left knee pain, sharp   Skin: Negative for color change and pallor.   Allergic/Immunologic: Negative for environmental allergies and food allergies.   Neurological: Negative for dizziness, speech difficulty, weakness, light-headedness, numbness and headaches.   Hematological: Negative for adenopathy. Does not bruise/bleed easily.   Psychiatric/Behavioral: Negative for dysphoric mood and sleep disturbance. The patient is not nervous/anxious.           Exam:Comprehensive exam done. BP (!) 163/76 (BP Location: Left arm, Patient Position: Sitting)   Pulse (!) 55   Ht 5' 9" (1.753 m)   Wt 90.7 kg (199 lb 15.3 oz)   SpO2 96% Comment: on room air  BMI 29.53 kg/m²   Exam included Vitals as listed  Constitutional: He is oriented to person, place, and time. He appears well-developed. No distress.   Nose: Nose normal.   Mouth/Throat: Uvula is midline, oropharynx is clear and moist and mucous membranes are normal. No dental caries. No oropharyngeal exudate, posterior oropharyngeal edema, posterior oropharyngeal erythema or tonsillar abscesses.  Mallapatti (M) score 3  Eyes: Pupils are equal, round, and reactive to light.   Neck: No JVD present. No thyromegaly present.   Cardiovascular: Normal rate, regular rhythm and normal heart sounds. Exam reveals no gallop and no friction rub.   No murmur heard.  Pulmonary/Chest: Effort normal and breath sounds normal. No accessory muscle usage or stridor. No apnea and no tachypnea. No " respiratory distress, decreased breath sounds, wheezes, rhonchi, rales, or tenderness.   Abdominal: Soft. He exhibits no mass. There is no tenderness. No hepatosplenomegaly, hernias and normoactive bowel sounds  Musculoskeletal: Normal range of motion. exhibits no edema.   Lymphadenopathy:     He has no cervical adenopathy.     He has no axillary adenopathy.   Neurological:  alert and oriented to person, place, and time. not disoriented.   Skin: Skin is warm and dry.No cyanosis or erythema. No pallor. Nails show no clubbing.   Psychiatric: normal mood and affect. behavior is normal. Judgment and thought content normal.       Radiographs (ct chest and cxr) reviewed: results reviewed   EXAMINATION:  XR CHEST PA AND LATERAL    CLINICAL HISTORY:  Bronchiectasis, uncomplicated    TECHNIQUE:  PA and lateral views of the chest were performed.    COMPARISON:  9/21/2016    FINDINGS:  The cardiomediastinal silhouette is stable.  There is hyperinflation of the lungs.  There is mild elevation of left hemidiaphragm relative to the right.  There is bilateral apical pleural and parenchymal scarring more so on the left and mild cephalad retraction of the left hilum and there is mild chronic interstitial prominence particularly left lung base.  There are postoperative changes left hemithorax.  There is no new or confluent infiltrate or pleural effusion.      Impression       No acute cardiopulmonary disease or significant oval change compared to the prior exam.      Electronically signed by: Janette Bryson MD  Date: 07/10/2018  Time: 10:12         Labs reviewed    [facility] follows labwork: reviewed    PFT ordered and will be reviewed      Plan:  Clinical impression is apparently straight forward and impression with management as below.    Vazquez was seen today for medication refills.    Diagnoses and all orders for this visit:    Bronchiectasis without complication    Mixed type COPD (chronic obstructive pulmonary disease)  -      Discontinue: HYDROcodone-acetaminophen (NORCO) 5-325 mg per tablet; Take 1 tablet by mouth every 4 (four) hours as needed for Pain (cough).  -     Discontinue: HYDROcodone-acetaminophen (NORCO) 5-325 mg per tablet; Take 1 tablet by mouth every 4 (four) hours as needed for Pain (cough).  -     HYDROcodone-acetaminophen (NORCO) 5-325 mg per tablet; Take 1 tablet by mouth every 4 (four) hours as needed for Pain.  -     HYDROcodone-acetaminophen (NORCO) 5-325 mg per tablet; Take 1 tablet by mouth every 4 (four) hours as needed for Pain (cough).  -     HYDROcodone-acetaminophen (NORCO) 5-325 mg per tablet; Take 1 tablet by mouth every 4 (four) hours as needed for Pain (cough).    Chronic bronchitis, mucopurulent  -     Discontinue: HYDROcodone-acetaminophen (NORCO) 5-325 mg per tablet; Take 1 tablet by mouth every 4 (four) hours as needed for Pain (cough).  -     Discontinue: HYDROcodone-acetaminophen (NORCO) 5-325 mg per tablet; Take 1 tablet by mouth every 4 (four) hours as needed for Pain (cough).  -     HYDROcodone-acetaminophen (NORCO) 5-325 mg per tablet; Take 1 tablet by mouth every 4 (four) hours as needed for Pain.  -     HYDROcodone-acetaminophen (NORCO) 5-325 mg per tablet; Take 1 tablet by mouth every 4 (four) hours as needed for Pain (cough).  -     HYDROcodone-acetaminophen (NORCO) 5-325 mg per tablet; Take 1 tablet by mouth every 4 (four) hours as needed for Pain (cough).    Acute right-sided low back pain with right-sided sciatica  -     Discontinue: HYDROcodone-acetaminophen (NORCO) 5-325 mg per tablet; Take 1 tablet by mouth every 4 (four) hours as needed for Pain (cough).  -     Discontinue: HYDROcodone-acetaminophen (NORCO) 5-325 mg per tablet; Take 1 tablet by mouth every 4 (four) hours as needed for Pain (cough).  -     HYDROcodone-acetaminophen (NORCO) 5-325 mg per tablet; Take 1 tablet by mouth every 4 (four) hours as needed for Pain.  -     HYDROcodone-acetaminophen (NORCO) 5-325 mg per tablet;  Take 1 tablet by mouth every 4 (four) hours as needed for Pain (cough).  -     HYDROcodone-acetaminophen (NORCO) 5-325 mg per tablet; Take 1 tablet by mouth every 4 (four) hours as needed for Pain (cough).    COPD exacerbation  -     Discontinue: HYDROcodone-acetaminophen (NORCO) 5-325 mg per tablet; Take 1 tablet by mouth every 4 (four) hours as needed for Pain (cough).  -     Discontinue: HYDROcodone-acetaminophen (NORCO) 5-325 mg per tablet; Take 1 tablet by mouth every 4 (four) hours as needed for Pain (cough).  -     HYDROcodone-acetaminophen (NORCO) 5-325 mg per tablet; Take 1 tablet by mouth every 4 (four) hours as needed for Pain.  -     HYDROcodone-acetaminophen (NORCO) 5-325 mg per tablet; Take 1 tablet by mouth every 4 (four) hours as needed for Pain (cough).  -     HYDROcodone-acetaminophen (NORCO) 5-325 mg per tablet; Take 1 tablet by mouth every 4 (four) hours as needed for Pain (cough).        Follow up in about 3 months (around 7/15/2019).    Discussed with patient above for education the following:      Patient Instructions   Sent in norco 5 - 65 , then 60 & 60-- for next 3 months

## 2019-07-16 ENCOUNTER — OFFICE VISIT (OUTPATIENT)
Dept: PULMONOLOGY | Facility: CLINIC | Age: 84
End: 2019-07-16
Payer: MEDICARE

## 2019-07-16 ENCOUNTER — TELEPHONE (OUTPATIENT)
Dept: PULMONOLOGY | Facility: CLINIC | Age: 84
End: 2019-07-16

## 2019-07-16 VITALS
HEIGHT: 69 IN | BODY MASS INDEX: 30.27 KG/M2 | OXYGEN SATURATION: 96 % | DIASTOLIC BLOOD PRESSURE: 79 MMHG | SYSTOLIC BLOOD PRESSURE: 162 MMHG | WEIGHT: 204.38 LBS | HEART RATE: 53 BPM

## 2019-07-16 DIAGNOSIS — J47.9 BRONCHIECTASIS WITHOUT COMPLICATION: ICD-10-CM

## 2019-07-16 DIAGNOSIS — J41.1 CHRONIC BRONCHITIS, MUCOPURULENT: ICD-10-CM

## 2019-07-16 DIAGNOSIS — M54.41 ACUTE RIGHT-SIDED LOW BACK PAIN WITH RIGHT-SIDED SCIATICA: ICD-10-CM

## 2019-07-16 DIAGNOSIS — J44.9 MIXED TYPE COPD (CHRONIC OBSTRUCTIVE PULMONARY DISEASE): ICD-10-CM

## 2019-07-16 DIAGNOSIS — D84.9 IMMUNE DEFICIENCY DISORDER: Primary | ICD-10-CM

## 2019-07-16 DIAGNOSIS — J44.1 COPD EXACERBATION: ICD-10-CM

## 2019-07-16 PROCEDURE — 99214 PR OFFICE/OUTPT VISIT, EST, LEVL IV, 30-39 MIN: ICD-10-PCS | Mod: S$PBB,,, | Performed by: INTERNAL MEDICINE

## 2019-07-16 PROCEDURE — 99214 OFFICE O/P EST MOD 30 MIN: CPT | Mod: PBBFAC,PO | Performed by: INTERNAL MEDICINE

## 2019-07-16 PROCEDURE — 99999 PR PBB SHADOW E&M-EST. PATIENT-LVL IV: CPT | Mod: PBBFAC,,, | Performed by: INTERNAL MEDICINE

## 2019-07-16 PROCEDURE — 99999 PR PBB SHADOW E&M-EST. PATIENT-LVL IV: ICD-10-PCS | Mod: PBBFAC,,, | Performed by: INTERNAL MEDICINE

## 2019-07-16 PROCEDURE — 99214 OFFICE O/P EST MOD 30 MIN: CPT | Mod: S$PBB,,, | Performed by: INTERNAL MEDICINE

## 2019-07-16 RX ORDER — LEVOFLOXACIN 500 MG/1
TABLET, FILM COATED ORAL
Qty: 10 TABLET | Refills: 2 | Status: SHIPPED | OUTPATIENT
Start: 2019-07-16 | End: 2019-08-10 | Stop reason: CLARIF

## 2019-07-16 RX ORDER — HYDROCODONE BITARTRATE AND ACETAMINOPHEN 5; 325 MG/1; MG/1
1 TABLET ORAL EVERY 6 HOURS
Qty: 28 TABLET | Refills: 0 | Status: SHIPPED | OUTPATIENT
Start: 2019-07-16 | End: 2019-08-10 | Stop reason: CLARIF

## 2019-07-16 RX ORDER — TAMSULOSIN HYDROCHLORIDE 0.4 MG/1
0.4 CAPSULE ORAL DAILY
COMMUNITY
End: 2019-08-10 | Stop reason: CLARIF

## 2019-07-16 RX ORDER — METHYLPREDNISOLONE 4 MG/1
TABLET ORAL
Qty: 1 PACKAGE | Refills: 1 | Status: SHIPPED | OUTPATIENT
Start: 2019-07-16 | End: 2019-08-06

## 2019-07-16 NOTE — PATIENT INSTRUCTIONS
Use hydrocodone cough meds    Use steroids if back or lungs flare    Suggest flonase for sinus drip.

## 2019-07-16 NOTE — TELEPHONE ENCOUNTER
The patient will  the written rx.      ----- Message from Azalia Bernal sent at 7/16/2019  3:39 PM CDT -----  Contact: [facility]  Pharmacy called regarding  for the above Pt.  pharmacy can be reached at [phone]

## 2019-07-16 NOTE — TELEPHONE ENCOUNTER
Provider notified that pharmacy does not carry or make cough syrup ordered.     ----- Message from Lilia Miramontes sent at 7/16/2019 10:47 AM CDT -----  Contact: Emily from [facility]  Pharmacy says pt need to get a medication mackenzie    Says Obredin need to be changed says they don't make that anymore    Emily can be reached at [phone]

## 2019-07-16 NOTE — PROGRESS NOTES
7/16/2019    Shukla Berto [facility]  Office Note    Chief Complaint   Patient presents with    Medication Refill       HPI:   July 16, 2019- sciatica resolved, recommended medrol dose pack but clear.  Uses norco for cough, only got 65 pills in April with no refils.  Having am hoarseness with copious sinus drip.    April 15, 2019- seeing Dr Alejandro [place] - dx sciatiac.    Patient Instructions   Sent in norco 5 - 65 , then 60 & 60-- for next 3 months  April 4, 2019- major complaint left knee pain, pain 8 on 0-10 scale, onset 6 weeks, no improvement, took Norco 5-325 with slight improvement able to walk. SOB with exertion, cough nonproductive, worse during day.   Appointment Dr. Alejandro orthopedic surgeon scheduled 4/9/19.   Addendum 2/28/2019- pt call stating back pain sciatic involvement not improved since last appointment. Requesting referral to orthopedics.  Patient Instructions   Sciatic nerve is acutely inflamed. Will order Norco 5 every 4 hours for 7 days, will get you to your appointment with Orthopedic surgeon  Continue current COPD therapy.  Jan 4, 2010 - had had up to 1/2 cup mucous/d.   H/o bronchiectiectasis. 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   Back pain limits activity. States no current cough. Using Trelegy daily. States improved breathing.  Pt states cough controlled with Norco only with Bronchitis exacerbation. 1-2 pills daily. Last used in week prior.  10/31/18 SOB-with exertion, worse at day, relieved by rest and Albuterol inhaler, albuterol 1x weekly, has had to stop fishing. Currently using treley daily.   States Trelegy is the best inhaler he has had.    Patient Instructions   Stop Azithromycin  Use Levaquin 500 mg for 5 days for yellow green mucous  Can continue for up to 7 days if needed to clear secretions.  Continue Trelegy daily  Use Albuterol inhaler 2 puffs every 4 hours as needed for SOB and wheeze  July 23, 2018- took abx but run out  abx, uses norco for violent cough.  trelegy works great.  2018- having ivory mucous last 6 wks with violent cough, breathing ok, appetite ok  -  Feeling better, having dry nonproductive cough, no abx/sterois  2017, took tripak 3 days for 5 times and have had no problem.  Has been on digoxin for afib . Has had yellow mucous from chest with negative cultures.  Has spontneous pneumo 20 yrs ago, had tb at 40 yo.  Has had lung problems last 20 yrs with stagnant pattern. Mucous up to 1/2 cup a day.  2016HPI: now cleared lingering flare, cultures neg, hydrocodone- excellent cough suppression and has 12 left. No cough, or wheeze or sob.      The chief compliant  problem is worsening  PFSH:  Past Medical History:   Diagnosis Date    Arthritis     Finger Rt hand    Atrial fibrillation ,     Cardioversion X2  Dr. Cortes, Cardiologist    BPH (benign prostatic hyperplasia)     COPD (chronic obstructive pulmonary disease)     Hypertension     Kidney mass     Pneumothorax     25 years ago    TB (pulmonary tuberculosis)          Past Surgical History:   Procedure Laterality Date    colonscopy      EYE SURGERY      Bilateral cataracs      head surgery      growth removed    KIDNEY SURGERY      L kidney removed.     LUNG SURGERY      NEPHRECTOMY-RADICAL Left 2015    [facility]    PROSTATE SURGERY      Varicele       Social History     Tobacco Use    Smoking status: Former Smoker     Last attempt to quit: 1969     Years since quittin.5    Smokeless tobacco: Never Used   Substance Use Topics    Alcohol use: No    Drug use: No     Family History   Problem Relation Age of Onset    Kidney disease Father     Tuberculosis Father      Review of patient's allergies indicates:   Allergen Reactions    Hydrocodone Itching     I have reviewed past medical, family, and social history. 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    Performance Status:The patient's activity level is housebound activities.         Review of Systems   Constitutional: Negative for activity change, appetite change, chills, diaphoresis, fatigue, fever and unexpected weight change.   HENT: Negative for dental problem, postnasal drip, rhinorrhea, sinus pressure, sinus pain, sneezing, sore throat, trouble swallowing and voice change.    Respiratory: Negative for apnea, chest tightness, shortness of breath, wheezing and stridor.  Positive for cough  Cardiovascular: Negative for chest pain, palpitations and leg swelling.   Gastrointestinal: Negative for abdominal distention, abdominal pain, constipation and nausea.   Musculoskeletal: Negative for gait problem and neck pain.Positive for lower back and left knee pain, sharp   Skin: Negative for color change and pallor.   Allergic/Immunologic: Negative for environmental allergies and food allergies.   Neurological: Negative for dizziness, speech difficulty, weakness, light-headedness, numbness and headaches.   Hematological: Negative for adenopathy. Does not bruise/bleed easily.   Psychiatric/Behavioral: Negative for dysphoric mood and sleep disturbance. The patient is not nervous/anxious.           Exam:Comprehensive exam done. BP (!) 162/79 (BP Location: Right arm, Patient Position: Sitting)   Pulse (!) 53   Ht 5' 9" (1.753 m)   Wt 92.7 kg (204 lb 5.9 oz)   SpO2 96% Comment: on room air  BMI 30.18 kg/m²   Exam included Vitals as listed  Constitutional: He is oriented to person, place, and time. He appears well-developed. No distress.   Nose: Nose normal.   Mouth/Throat: Uvula is midline, oropharynx is clear and moist and mucous membranes are normal. No dental caries. No oropharyngeal exudate, posterior oropharyngeal edema, posterior oropharyngeal erythema or tonsillar abscesses.  M[place]apatti (M) score 3  Eyes: Pupils are equal, round, and reactive to light.   Neck: No JVD present. No thyromegaly present. "   Cardiovascular: Normal rate, regular rhythm and normal heart sounds. Exam reveals no gallop and no friction rub.   No murmur heard.  Pulmonary/Chest: Effort normal and breath sounds normal. No accessory muscle usage or stridor. No apnea and no tachypnea. No respiratory distress, decreased breath sounds, wheezes, rhonchi, rales, or tenderness.   Abdominal: Soft. He exhibits no mass. There is no tenderness. No hepatosplenomegaly, hernias and normoactive bowel sounds  Musculoskeletal: Normal range of motion. exhibits no edema.   Lymphadenopathy:     He has no cervical adenopathy.     He has no axillary adenopathy.   Neurological:  alert and oriented to person, place, and time. not disoriented.   Skin: Skin is warm and dry.No cyanosis or erythema. No pallor. Nails show no clubbing.   Psychiatric: normal mood and affect. behavior is normal. Judgment and thought content normal.       Radiographs (ct chest and cxr) reviewed: results reviewed   EXAMINATION:  XR CHEST PA AND LATERAL    CLINICAL HISTORY:  Bronchiectasis, uncomplicated    TECHNIQUE:  PA and lateral views of the chest were performed.    COMPARISON:  9/21/2016    FINDINGS:  The cardiomediastinal silhouette is stable.  There is hyperinflation of the lungs.  There is mild elevation of left hemidiaphragm relative to the right.  There is bilateral apical pleural and parenchymal scarring more so on the left and mild cephalad retraction of the left hilum and there is mild chronic interstitial prominence particularly left lung base.  There are postoperative changes left hemithorax.  There is no new or confluent infiltrate or pleural effusion.      Impression       No acute cardiopulmonary disease or significant oval change compared to the prior exam.      Electronically signed by: Janette Bryson MD  Date: 07/10/2018  Time: 10:12         Labs reviewed    [facility] follows labwork: reviewed    PFT ordered and will be reviewed      Plan:  Clinical impression is apparently  straight forward and impression with management as below.    Vazquez was seen today for medication refill.    Diagnoses and all orders for this visit:    Immune deficiency disorder    Bronchiectasis without complication    Chronic bronchitis, mucopurulent    COPD exacerbation    Mixed type COPD (chronic obstructive pulmonary disease)    Acute right-sided low back pain with right-sided sciatica        Follow up in about 3 months (around 10/16/2019), or if symptoms worsen or fail to improve.    Discussed with patient above for education the following:      Patient Instructions   Use hydrocodone cough meds    Use steroids if back or lungs flare    Suggest flonase for sinus drip.

## 2019-08-10 ENCOUNTER — HOSPITAL ENCOUNTER (OUTPATIENT)
Facility: HOSPITAL | Age: 84
Discharge: HOME | End: 2019-08-11
Attending: EMERGENCY MEDICINE | Admitting: INTERNAL MEDICINE
Payer: MEDICARE

## 2019-08-10 DIAGNOSIS — I10 ESSENTIAL HYPERTENSION: ICD-10-CM

## 2019-08-10 DIAGNOSIS — R07.9 CHEST PAIN: ICD-10-CM

## 2019-08-10 DIAGNOSIS — J43.9 PULMONARY EMPHYSEMA, UNSPECIFIED EMPHYSEMA TYPE: Primary | ICD-10-CM

## 2019-08-10 PROBLEM — S91.109A OPEN TOE WOUND, INITIAL ENCOUNTER: Status: ACTIVE | Noted: 2019-08-10

## 2019-08-10 LAB
ALBUMIN SERPL BCP-MCNC: 4 G/DL (ref 3.5–5.2)
ALP SERPL-CCNC: 62 U/L (ref 55–135)
ALT SERPL W/O P-5'-P-CCNC: 18 U/L (ref 10–44)
ANION GAP SERPL CALC-SCNC: 12 MMOL/L (ref 8–16)
AST SERPL-CCNC: 28 U/L (ref 10–40)
BASOPHILS # BLD AUTO: 0.05 K/UL (ref 0–0.2)
BASOPHILS NFR BLD: 0.5 % (ref 0–1.9)
BILIRUB SERPL-MCNC: 0.9 MG/DL (ref 0.1–1)
BNP SERPL-MCNC: 254 PG/ML (ref 0–99)
BUN SERPL-MCNC: 20 MG/DL (ref 8–23)
CALCIUM SERPL-MCNC: 9.5 MG/DL (ref 8.7–10.5)
CHLORIDE SERPL-SCNC: 105 MMOL/L (ref 95–110)
CO2 SERPL-SCNC: 24 MMOL/L (ref 23–29)
CREAT SERPL-MCNC: 1.6 MG/DL (ref 0.5–1.4)
DIFFERENTIAL METHOD: ABNORMAL
DIGOXIN SERPL-MCNC: 0.4 NG/ML (ref 0.8–2)
EOSINOPHIL # BLD AUTO: 0.1 K/UL (ref 0–0.5)
EOSINOPHIL NFR BLD: 0.9 % (ref 0–8)
ERYTHROCYTE [DISTWIDTH] IN BLOOD BY AUTOMATED COUNT: 13.1 % (ref 11.5–14.5)
EST. GFR  (AFRICAN AMERICAN): 44.4 ML/MIN/1.73 M^2
EST. GFR  (NON AFRICAN AMERICAN): 38.4 ML/MIN/1.73 M^2
GLUCOSE SERPL-MCNC: 136 MG/DL (ref 70–110)
GLUCOSE SERPL-MCNC: 201 MG/DL (ref 70–110)
HCT VFR BLD AUTO: 48.4 % (ref 40–54)
HGB BLD-MCNC: 15.9 G/DL (ref 14–18)
IMM GRANULOCYTES # BLD AUTO: 0.04 K/UL (ref 0–0.04)
IMM GRANULOCYTES NFR BLD AUTO: 0.4 % (ref 0–0.5)
INR PPP: 1.6
LYMPHOCYTES # BLD AUTO: 1.2 K/UL (ref 1–4.8)
LYMPHOCYTES NFR BLD: 12.5 % (ref 18–48)
MAGNESIUM SERPL-MCNC: 2.1 MG/DL (ref 1.6–2.6)
MCH RBC QN AUTO: 30 PG (ref 27–31)
MCHC RBC AUTO-ENTMCNC: 32.9 G/DL (ref 32–36)
MCV RBC AUTO: 91 FL (ref 82–98)
MONOCYTES # BLD AUTO: 0.7 K/UL (ref 0.3–1)
MONOCYTES NFR BLD: 7.8 % (ref 4–15)
NEUTROPHILS # BLD AUTO: 7.3 K/UL (ref 1.8–7.7)
NEUTROPHILS NFR BLD: 77.9 % (ref 38–73)
NRBC BLD-RTO: 0 /100 WBC
PLATELET # BLD AUTO: 222 K/UL (ref 150–350)
PMV BLD AUTO: 9.4 FL (ref 9.2–12.9)
POTASSIUM SERPL-SCNC: 4.4 MMOL/L (ref 3.5–5.1)
PROT SERPL-MCNC: 7.8 G/DL (ref 6–8.4)
PROTHROMBIN TIME: 18 SEC (ref 11.7–14)
RBC # BLD AUTO: 5.3 M/UL (ref 4.6–6.2)
SODIUM SERPL-SCNC: 141 MMOL/L (ref 136–145)
TROPONIN I SERPL DL<=0.01 NG/ML-MCNC: <0.03 NG/ML (ref 0.02–0.04)
TROPONIN I SERPL DL<=0.01 NG/ML-MCNC: <0.03 NG/ML (ref 0.02–0.04)
WBC # BLD AUTO: 9.31 K/UL (ref 3.9–12.7)

## 2019-08-10 PROCEDURE — G0378 HOSPITAL OBSERVATION PER HR: HCPCS

## 2019-08-10 PROCEDURE — 25000003 PHARM REV CODE 250: Performed by: NURSE PRACTITIONER

## 2019-08-10 PROCEDURE — 83880 ASSAY OF NATRIURETIC PEPTIDE: CPT

## 2019-08-10 PROCEDURE — 84484 ASSAY OF TROPONIN QUANT: CPT

## 2019-08-10 PROCEDURE — 85610 PROTHROMBIN TIME: CPT

## 2019-08-10 PROCEDURE — 96374 THER/PROPH/DIAG INJ IV PUSH: CPT

## 2019-08-10 PROCEDURE — 80162 ASSAY OF DIGOXIN TOTAL: CPT

## 2019-08-10 PROCEDURE — 80053 COMPREHEN METABOLIC PANEL: CPT

## 2019-08-10 PROCEDURE — 96372 THER/PROPH/DIAG INJ SC/IM: CPT | Mod: 59

## 2019-08-10 PROCEDURE — 63600175 PHARM REV CODE 636 W HCPCS: Mod: GZ | Performed by: NURSE PRACTITIONER

## 2019-08-10 PROCEDURE — 93005 ELECTROCARDIOGRAM TRACING: CPT

## 2019-08-10 PROCEDURE — 96375 TX/PRO/DX INJ NEW DRUG ADDON: CPT

## 2019-08-10 PROCEDURE — 85025 COMPLETE CBC W/AUTO DIFF WBC: CPT

## 2019-08-10 PROCEDURE — 84484 ASSAY OF TROPONIN QUANT: CPT | Mod: 91

## 2019-08-10 PROCEDURE — 83735 ASSAY OF MAGNESIUM: CPT

## 2019-08-10 PROCEDURE — 36415 COLL VENOUS BLD VENIPUNCTURE: CPT

## 2019-08-10 PROCEDURE — 25000242 PHARM REV CODE 250 ALT 637 W/ HCPCS: Performed by: NURSE PRACTITIONER

## 2019-08-10 PROCEDURE — 94640 AIRWAY INHALATION TREATMENT: CPT

## 2019-08-10 PROCEDURE — 25000242 PHARM REV CODE 250 ALT 637 W/ HCPCS: Performed by: EMERGENCY MEDICINE

## 2019-08-10 PROCEDURE — 82553 CREATINE MB FRACTION: CPT

## 2019-08-10 PROCEDURE — 63600175 PHARM REV CODE 636 W HCPCS: Performed by: EMERGENCY MEDICINE

## 2019-08-10 PROCEDURE — 99285 EMERGENCY DEPT VISIT HI MDM: CPT | Mod: 25

## 2019-08-10 RX ORDER — HYDRALAZINE HYDROCHLORIDE 20 MG/ML
10 INJECTION INTRAMUSCULAR; INTRAVENOUS
Status: COMPLETED | OUTPATIENT
Start: 2019-08-10 | End: 2019-08-10

## 2019-08-10 RX ORDER — INSULIN ASPART 100 [IU]/ML
0-5 INJECTION, SOLUTION INTRAVENOUS; SUBCUTANEOUS
Status: DISCONTINUED | OUTPATIENT
Start: 2019-08-10 | End: 2019-08-11 | Stop reason: HOSPADM

## 2019-08-10 RX ORDER — SIMVASTATIN 20 MG/1
20 TABLET, FILM COATED ORAL NIGHTLY
Status: DISCONTINUED | OUTPATIENT
Start: 2019-08-10 | End: 2019-08-11 | Stop reason: HOSPADM

## 2019-08-10 RX ORDER — DIGOXIN 125 MCG
125 TABLET ORAL DAILY
Status: DISCONTINUED | OUTPATIENT
Start: 2019-08-11 | End: 2019-08-11 | Stop reason: HOSPADM

## 2019-08-10 RX ORDER — LEVALBUTEROL 1.25 MG/.5ML
1.25 SOLUTION, CONCENTRATE RESPIRATORY (INHALATION)
Status: COMPLETED | OUTPATIENT
Start: 2019-08-10 | End: 2019-08-10

## 2019-08-10 RX ORDER — AMLODIPINE BESYLATE 5 MG/1
5 TABLET ORAL DAILY
Status: DISCONTINUED | OUTPATIENT
Start: 2019-08-11 | End: 2019-08-11

## 2019-08-10 RX ORDER — AMOXICILLIN 250 MG
1 CAPSULE ORAL DAILY PRN
Status: DISCONTINUED | OUTPATIENT
Start: 2019-08-10 | End: 2019-08-11 | Stop reason: HOSPADM

## 2019-08-10 RX ORDER — ACETAMINOPHEN 325 MG/1
650 TABLET ORAL EVERY 4 HOURS PRN
Status: DISCONTINUED | OUTPATIENT
Start: 2019-08-10 | End: 2019-08-11 | Stop reason: HOSPADM

## 2019-08-10 RX ORDER — POTASSIUM CHLORIDE 750 MG/1
10 CAPSULE, EXTENDED RELEASE ORAL DAILY
Status: DISCONTINUED | OUTPATIENT
Start: 2019-08-11 | End: 2019-08-11 | Stop reason: HOSPADM

## 2019-08-10 RX ORDER — IPRATROPIUM BROMIDE 0.5 MG/2.5ML
0.5 SOLUTION RESPIRATORY (INHALATION) EVERY 6 HOURS PRN
Status: DISCONTINUED | OUTPATIENT
Start: 2019-08-10 | End: 2019-08-11 | Stop reason: HOSPADM

## 2019-08-10 RX ORDER — SODIUM CHLORIDE 0.9 % (FLUSH) 0.9 %
10 SYRINGE (ML) INJECTION
Status: DISCONTINUED | OUTPATIENT
Start: 2019-08-10 | End: 2019-08-11 | Stop reason: HOSPADM

## 2019-08-10 RX ORDER — ONDANSETRON 2 MG/ML
4 INJECTION INTRAMUSCULAR; INTRAVENOUS EVERY 8 HOURS PRN
Status: DISCONTINUED | OUTPATIENT
Start: 2019-08-10 | End: 2019-08-11 | Stop reason: HOSPADM

## 2019-08-10 RX ORDER — GABAPENTIN 300 MG/1
300 CAPSULE ORAL 2 TIMES DAILY
Status: DISCONTINUED | OUTPATIENT
Start: 2019-08-10 | End: 2019-08-11 | Stop reason: HOSPADM

## 2019-08-10 RX ORDER — DABIGATRAN ETEXILATE 75 MG/1
75 CAPSULE ORAL 2 TIMES DAILY
Status: DISCONTINUED | OUTPATIENT
Start: 2019-08-10 | End: 2019-08-11 | Stop reason: HOSPADM

## 2019-08-10 RX ORDER — METHYLPREDNISOLONE SOD SUCC 125 MG
125 VIAL (EA) INJECTION
Status: COMPLETED | OUTPATIENT
Start: 2019-08-10 | End: 2019-08-10

## 2019-08-10 RX ORDER — HYDRALAZINE HYDROCHLORIDE 20 MG/ML
10 INJECTION INTRAMUSCULAR; INTRAVENOUS EVERY 6 HOURS PRN
Status: DISCONTINUED | OUTPATIENT
Start: 2019-08-10 | End: 2019-08-11 | Stop reason: HOSPADM

## 2019-08-10 RX ORDER — LEVALBUTEROL INHALATION SOLUTION 0.63 MG/3ML
0.63 SOLUTION RESPIRATORY (INHALATION) EVERY 6 HOURS
Status: DISCONTINUED | OUTPATIENT
Start: 2019-08-10 | End: 2019-08-10

## 2019-08-10 RX ORDER — LEVALBUTEROL INHALATION SOLUTION 0.63 MG/3ML
0.63 SOLUTION RESPIRATORY (INHALATION) EVERY 6 HOURS PRN
Status: DISCONTINUED | OUTPATIENT
Start: 2019-08-10 | End: 2019-08-11 | Stop reason: HOSPADM

## 2019-08-10 RX ORDER — ASPIRIN 81 MG/1
81 TABLET ORAL DAILY
Status: DISCONTINUED | OUTPATIENT
Start: 2019-08-11 | End: 2019-08-11 | Stop reason: HOSPADM

## 2019-08-10 RX ORDER — IPRATROPIUM BROMIDE 0.5 MG/2.5ML
0.5 SOLUTION RESPIRATORY (INHALATION) EVERY 6 HOURS
Status: DISCONTINUED | OUTPATIENT
Start: 2019-08-10 | End: 2019-08-10

## 2019-08-10 RX ADMIN — DABIGATRAN ETEXILATE MESYLATE 75 MG: 75 CAPSULE ORAL at 09:08

## 2019-08-10 RX ADMIN — METHYLPREDNISOLONE SODIUM SUCCINATE 40 MG: 40 INJECTION, POWDER, FOR SOLUTION INTRAMUSCULAR; INTRAVENOUS at 09:08

## 2019-08-10 RX ADMIN — SIMVASTATIN 20 MG: 20 TABLET, FILM COATED ORAL at 09:08

## 2019-08-10 RX ADMIN — LEVALBUTEROL HYDROCHLORIDE 1.25 MG: 1.25 SOLUTION, CONCENTRATE RESPIRATORY (INHALATION) at 04:08

## 2019-08-10 RX ADMIN — INSULIN ASPART 1 UNITS: 100 INJECTION, SOLUTION INTRAVENOUS; SUBCUTANEOUS at 09:08

## 2019-08-10 RX ADMIN — METHYLPREDNISOLONE SODIUM SUCCINATE 125 MG: 125 INJECTION, POWDER, FOR SOLUTION INTRAMUSCULAR; INTRAVENOUS at 03:08

## 2019-08-10 RX ADMIN — LEVALBUTEROL HYDROCHLORIDE 0.63 MG: 0.63 SOLUTION RESPIRATORY (INHALATION) at 09:08

## 2019-08-10 RX ADMIN — HYDRALAZINE HYDROCHLORIDE 10 MG: 20 INJECTION INTRAMUSCULAR; INTRAVENOUS at 03:08

## 2019-08-10 RX ADMIN — LEVALBUTEROL HYDROCHLORIDE 1.25 MG: 1.25 SOLUTION, CONCENTRATE RESPIRATORY (INHALATION) at 12:08

## 2019-08-10 RX ADMIN — GABAPENTIN 300 MG: 300 CAPSULE ORAL at 09:08

## 2019-08-10 RX ADMIN — IPRATROPIUM BROMIDE 0.5 MG: 0.5 SOLUTION RESPIRATORY (INHALATION) at 09:08

## 2019-08-10 NOTE — HOSPITAL COURSE
Patient evaluated in the ED for Chest pain and SOB. EKG shows A-fib with frequent PVCs. He is on Pradaxa for A-fib. He continues to have SOB and states he 'feels bad.'  His blood pressure is found to be elevated with SBP 200s. He is treated with neb treatments, solumedrol, and hydralazine. He will be admitted to [facility] for further eval and treatment.

## 2019-08-10 NOTE — ASSESSMENT & PLAN NOTE
Admit to Cardiology  Continuous cardiac monitoring  Trend cardiac enzymes  Continue home medications including Pradaxa  Consult Dr. Cerda

## 2019-08-10 NOTE — H&P
[facility] Medicine  History & Physical    Patient Name: Vazquez Vegas  MRN: [id number]  Admission Date: 8/10/2019  Attending Physician: Souleymane Blakely MD  Primary Care Provider: Eddie Cortes MD         Patient information was obtained from patient, spouse/SO and ER records.     Subjective:     Principal Problem:<principal problem not specified>    Chief Complaint:   Chief Complaint   Patient presents with    Shortness of Breath     THIS AM    Chest Pain     STARTED AS INDIGESTION ON THURSDAY        HPI: Patient presents to the ED with the complaint of chest pain and SOB that began on Thursday-2 days ago. He reports he first had SOB with mid-sternal chest tightness on Thursday evening. He went to bed and felt better on Friday. This morning, he began to have chest tightness again with SOB. The pain does not radiate. No aggravating or alleviating factors. He does report intermittent palpitations. He reports a history of COPD for which he is followed by . He denies fever,chills, productive cough, weakness, lightheadedness, dizziness, or LOC.     Past Medical History:   Diagnosis Date    Arthritis     Finger Rt hand    Atrial fibrillation 2012, 2013    Cardioversion X2  Dr. Cortes, Cardiologist    BPH (benign prostatic hyperplasia)     COPD (chronic obstructive pulmonary disease)     Diabetes mellitus     Hypertension     Kidney mass     Migraine headache     Pneumothorax     25 years ago    Renal disorder     ONLY HAS RT KIDNEY    TB (pulmonary tuberculosis)        Past Surgical History:   Procedure Laterality Date    colonscopy      EYE SURGERY  2013    Bilateral cataracs      head surgery      growth removed    KIDNEY SURGERY      L kidney removed.     LUNG SURGERY      NEPHRECTOMY-RADICAL Left 1/6/2015    Performed by [facility] at [facility] OR    PROSTATE SURGERY      Varicele         Review of patient's allergies indicates:   Allergen  Reactions    Hydrocodone Itching       No current facility-administered medications on file prior to encounter.      Current Outpatient Medications on File Prior to Encounter   Medication Sig    amlodipine (NORVASC) 5 MG tablet Take 5 mg by mouth once daily.    aspirin (ECOTRIN) 81 MG EC tablet Take 81 mg by mouth once daily.    digoxin (LANOXIN) 125 mcg tablet Take 125 mcg by mouth once daily.    fish oil-omega-3 fatty acids 300-1,000 mg capsule Take 2 g by mouth once daily.    fluticasone-umeclidin-vilanter (TRELEGY ELLIPTA) 100-62.5-25 mcg DsDv Inhale 1 puff into the lungs once daily.    gabapentin (NEURONTIN) 300 MG capsule bid    multivit, min cmb#20-iron-FA 27-1 mg Tab Take 1 tablet by mouth once daily.    potassium chloride (KLOR-CON) 10 MEQ TbSR 10 mEq once daily.     PRADAXA 75 mg Cap 75 mg 2 (two) times daily.     PROAIR HFA 90 mcg/actuation inhaler Inhale 1-2 puffs into the lungs every 4 (four) hours as needed for Wheezing.    simvastatin (ZOCOR) 20 MG tablet Take 20 mg by mouth every evening.    FLUZONE HIGH-DOSE 2018-19, PF, 180 mcg/0.5 mL vaccine ADM 0.5ML IM UTD    hydrocodone-guaifenesin 2.5-200 mg/5 mL Soln Take 10 mLs by mouth 4 (four) times daily as needed.    ONETOUCH DELICA LANCETS 33 gauge Misc TEST ONCE DAILY.    ONETOUCH VERIO Strp TEST ONCE DAILY.    [DISCONTINUED] HYDROcodone-acetaminophen (NORCO) 5-325 mg per tablet Take 1 tablet by mouth every 4 (four) hours as needed for Pain (cough).    [DISCONTINUED] HYDROcodone-acetaminophen (NORCO) 5-325 mg per tablet Take 1 tablet by mouth every 4 (four) hours as needed for Pain (cough).    [DISCONTINUED] HYDROcodone-acetaminophen (NORCO) 5-325 mg per tablet Take 1 tablet by mouth every 6 (six) hours. May use as needed    [DISCONTINUED] levoFLOXacin (LEVAQUIN) 500 MG tablet Take one pill a day for 5 days    [DISCONTINUED] predniSONE (DELTASONE) 20 MG tablet One daily for 7 days and repeat for bronchitis    [DISCONTINUED]  tamsulosin (FLOMAX) 0.4 mg Cap Take 0.4 mg by mouth once daily.     Family History     Problem Relation (Age of Onset)    Kidney disease Father    Tuberculosis Father        Tobacco Use    Smoking status: Former Smoker     Last attempt to quit: 1969     Years since quittin.6    Smokeless tobacco: Never Used   Substance and Sexual Activity    Alcohol use: No    Drug use: No    Sexual activity: Not on file     Review of Systems   Constitutional: Positive for activity change. Negative for appetite change, chills, diaphoresis, fatigue and fever.   HENT: Negative for congestion, ear pain, facial swelling, hearing loss, nosebleeds, rhinorrhea, sore throat, tinnitus, trouble swallowing and voice change.    Eyes: Negative for photophobia and visual disturbance.   Respiratory: Positive for cough, chest tightness, shortness of breath and wheezing. Negative for choking.    Cardiovascular: Positive for chest pain and palpitations. Negative for leg swelling.   Gastrointestinal: Negative for abdominal distention, abdominal pain, blood in stool, constipation, diarrhea, nausea and vomiting.   Endocrine: Negative for polydipsia, polyphagia and polyuria.   Genitourinary: Negative for difficulty urinating, discharge, dysuria, flank pain, frequency, hematuria, scrotal swelling, testicular pain and urgency.   Musculoskeletal: Negative for gait problem, joint swelling, myalgias, neck pain and neck stiffness.   Skin: Negative for rash and wound.   Neurological: Negative for dizziness, tremors, seizures, syncope, facial asymmetry, speech difficulty, weakness, light-headedness, numbness and headaches.   Hematological: Negative for adenopathy. Does not bruise/bleed easily.   Psychiatric/Behavioral: Negative for confusion, hallucinations, self-injury and suicidal ideas. The patient is not nervous/anxious.      Objective:     Vital Signs (Most Recent):  Temp: 97.7 °F (36.5 °C) (08/10/19 1143)  Pulse: 73 (08/10/19 1631)  Resp:  16 (08/10/19 1631)  BP: (!) 174/81 (08/10/19 1631)  SpO2: 97 % (08/10/19 1631) Vital Signs (24h Range):  Temp:  [97.7 °F (36.5 °C)] 97.7 °F (36.5 °C)  Pulse:  [50-78] 73  Resp:  [11-28] 16  SpO2:  [94 %-98 %] 97 %  BP: (173-219)/() 174/81     Weight: 90.7 kg (200 lb)  Body mass index is 27.12 kg/m².    Physical Exam   Constitutional: He is oriented to person, place, and time. He appears well-developed and well-nourished. No distress.   HENT:   Head: Normocephalic and atraumatic.   Nose: Nose normal. No nasal deformity. No epistaxis.   Mouth/Throat: Uvula is midline, oropharynx is clear and moist and mucous membranes are normal. No dental caries. No oropharyngeal exudate or posterior oropharyngeal erythema.   Eyes: Pupils are equal, round, and reactive to light. Conjunctivae, EOM and lids are normal. Lids are everted and swept, no foreign bodies found. Right eye exhibits no discharge. Left eye exhibits no discharge.   Neck: Trachea normal, normal range of motion, full passive range of motion without pain and phonation normal. Neck supple. Normal carotid pulses, no hepatojugular reflux and no JVD present. Carotid bruit is not present. No thyroid mass and no thyromegaly present.   Cardiovascular: S1 normal, S2 normal and normal pulses. An irregularly irregular rhythm present.   Murmur heard.  Pulmonary/Chest: Effort normal. No accessory muscle usage. No tachypnea. No respiratory distress. He has wheezes in the right upper field and the left upper field. He has no rhonchi. He has no rales.   Abdominal: Soft. Normal appearance and bowel sounds are normal. He exhibits no distension. There is no tenderness. No hernia.   Genitourinary: Testes normal.   Musculoskeletal: Normal range of motion. He exhibits no edema.   Lymphadenopathy:     He has no cervical adenopathy.     He has no axillary adenopathy.   Neurological: He is alert and oriented to person, place, and time. He has normal strength. He displays a negative  Romberg sign. Coordination normal.   Skin: Skin is warm and dry. Capillary refill takes less than 2 seconds. No rash noted. There is erythema.        Psychiatric: He has a normal mood and affect. His speech is normal and behavior is normal. Judgment and thought content normal. Cognition and memory are normal.         CRANIAL NERVES     CN III, IV, VI   Pupils are equal, round, and reactive to light.  Extraocular motions are normal.        Significant Labs:   CBC:   Recent Labs   Lab 08/10/19  1204   WBC 9.31   HGB 15.9   HCT 48.4        CMP:   Recent Labs   Lab 08/10/19  1204      K 4.4      CO2 24   *   BUN 20   CREATININE 1.6*   CALCIUM 9.5   PROT 7.8   ALBUMIN 4.0   BILITOT 0.9   ALKPHOS 62   AST 28   ALT 18   ANIONGAP 12   EGFRNONAA 38.4*     Cardiac Markers:   Recent Labs   Lab 08/10/19  1204   *     Magnesium:   Recent Labs   Lab 08/10/19  1204   MG 2.1     Troponin:   Recent Labs   Lab 08/10/19  1204   TROPONINI <0.030       Significant Imaging: I have reviewed all pertinent imaging results/findings within the past 24 hours.    Assessment/Plan:     Open toe wound, initial encounter  Wound care consult  accuchecks with SSI      Chest pain  Admit to Cardiology  Continuous cardiac monitoring  Trend cardiac enzymes  Continue home medications including Pradaxa  Consult Dr. Cerda      COPD exacerbation  Supplemental O2  Neb treatments  IV solumedrol  Consult Dr. Mendez      HTN (hypertension)  Continue home antihypertensives  PRN antihypertensives      Atrial fibrillation  Admit to Cardiology  Continuous cardiac monitoring  Trend cardiac enzymes  Continue home medications including Pradaxa  Consult Dr. Cerda        VTE Risk Mitigation (From admission, onward)    None             Jyotsna Coronado NP  Department of Hospital Medicine   [facility]

## 2019-08-10 NOTE — ED PROVIDER NOTES
Encounter Date: 8/10/2019       History     Chief Complaint   Patient presents with    Shortness of Breath     THIS AM    Chest Pain     STARTED AS INDIGESTION ON THURSDAY     Patient presents complaining of chest discomfort and shortness of breath that started yesterday.  Patient experiencing more chest tightness today.  Patient has history of COPD.  Atrial fibrillation.  He denies any radiating pain. No nausea or vomiting. Nothing really makes it better or worse.  He has not had this recently before.        Review of patient's allergies indicates:   Allergen Reactions    Hydrocodone Itching     Past Medical History:   Diagnosis Date    Arthritis     Finger Rt hand    Atrial fibrillation ,     Cardioversion X2  Dr. Cortes, Cardiologist    BPH (benign prostatic hyperplasia)     COPD (chronic obstructive pulmonary disease)     Diabetes mellitus     Hypertension     Kidney mass     Migraine headache     Pneumothorax     25 years ago    Renal disorder     ONLY HAS RT KIDNEY    TB (pulmonary tuberculosis)      Past Surgical History:   Procedure Laterality Date    colonscopy      EYE SURGERY      Bilateral cataracs      head surgery      growth removed    KIDNEY SURGERY      L kidney removed.     LUNG SURGERY      NEPHRECTOMY-RADICAL Left 2015    Performed by Venu Gonzales MD at [facility] OR    PROSTATE SURGERY      Varicele       Family History   Problem Relation Age of Onset    Kidney disease Father     Tuberculosis Father      Social History     Tobacco Use    Smoking status: Former Smoker     Last attempt to quit: 1969     Years since quittin.6    Smokeless tobacco: Never Used   Substance Use Topics    Alcohol use: No    Drug use: No     Review of Systems   Respiratory: Positive for wheezing.    Cardiovascular: Positive for chest pain.   All other systems reviewed and are negative.      Physical Exam     Initial Vitals [08/10/19 1143]   BP Pulse Resp Temp SpO2    (!) 189/84 (!) 50 (!) 24 97.7 °F (36.5 °C) 95 %      MAP       --         Physical Exam    Nursing note and vitals reviewed.  Constitutional: He appears well-developed and well-nourished. He is not diaphoretic. No distress.   HENT:   Head: Normocephalic and atraumatic.   Eyes: EOM are normal.   Neck: Normal range of motion. Neck supple.   Cardiovascular: Normal rate.   Irregularly irregular   Pulmonary/Chest: Effort normal. No respiratory distress. He has wheezes.   Abdominal: Soft. Bowel sounds are normal.   Musculoskeletal:        Right lower leg: Normal.        Left lower leg: Normal.   Neurological: He is alert and oriented to person, place, and time. He is not disoriented. No cranial nerve deficit.   Skin: Skin is warm and dry.   Psychiatric: He has a normal mood and affect. His behavior is normal. Judgment and thought content normal.         ED Course   Procedures  Labs Reviewed   CBC W/ AUTO DIFFERENTIAL - Abnormal; Notable for the following components:       Result Value    Gran% 77.9 (*)     Lymph% 12.5 (*)     All other components within normal limits   COMPREHENSIVE METABOLIC PANEL - Abnormal; Notable for the following components:    Glucose 136 (*)     Creatinine 1.6 (*)     eGFR if  44.4 (*)     eGFR if non  38.4 (*)     All other components within normal limits   B-TYPE NATRIURETIC PEPTIDE - Abnormal; Notable for the following components:     (*)     All other components within normal limits   PROTIME-INR - Abnormal; Notable for the following components:    PT 18.0 (*)     All other components within normal limits   TROPONIN I   DIGOXIN LEVEL   MAGNESIUM   MAGNESIUM   DIGOXIN LEVEL        ECG Results          EKG 12-lead (In process)  Result time 08/10/19 13:18:13    In process by Interface, Lab In [facility] (08/10/19 13:18:13)                 Narrative:    Test Reason : R07.9,    Vent. Rate : 069 BPM     Atrial Rate : 074 BPM     P-R Int : 000 ms          QRS Dur  : 100 ms      QT Int : 394 ms       P-R-T Axes : 000 -55 082 degrees     QTc Int : 422 ms    Atrial fibrillation with premature ventricular or aberrantly conducted  complexes  Left axis deviation  Inferior infarct (cited on or before 29-DEC-2014)  Anteroseptal infarct (cited on or before 29-DEC-2014)  ST and T wave abnormality, consider lateral ischemia  Abnormal ECG  When compared with ECG of 23-JAN-2015 16:23,  Questionable change in initial forces of Anterior leads  ST now depressed in Lateral leads  T wave inversion now evident in Lateral leads    Referred By: [email or web address]   SELF           Confirmed By:                   In process by Interface, Lab In [facility] (08/10/19 12:33:26)                 Narrative:    Test Reason : R07.9,    Vent. Rate : 069 BPM     Atrial Rate : 074 BPM     P-R Int : 000 ms          QRS Dur : 100 ms      QT Int : 394 ms       P-R-T Axes : 000 -55 082 degrees     QTc Int : 422 ms    Atrial fibrillation with premature ventricular or aberrantly conducted  complexes  Left axis deviation  Inferior infarct (cited on or before 29-DEC-2014)  Anteroseptal infarct (cited on or before 29-DEC-2014)  ST and T wave abnormality, consider lateral ischemia  Abnormal ECG  When compared with ECG of 23-JAN-2015 16:23,  Questionable change in initial forces of Anterior leads  ST now depressed in Lateral leads  T wave inversion now evident in Lateral leads    Referred By: [email or web address]   SELF           Confirmed By:                             Imaging Results          X-Ray Chest PA And Lateral (Final result)  Result time 08/10/19 12:13:00    Final result by Chase Carlin Jr., MD (08/10/19 12:13:00)                 Impression:      1. Indirect findings of COPD.  2. Prominent apical capping appears symmetric in appearance.  3. Mild perihilar scarring.  4. Linear scarring changes in the patient's left lung base show no further change as compared to previous.      Electronically signed by: Chase Carlin  MD  Date:    08/10/2019  Time:    12:13             Narrative:    EXAMINATION:  XR CHEST PA AND LATERAL    CLINICAL HISTORY:  Chest Pain;    TECHNIQUE:  PA and lateral views of the chest were performed.    COMPARISON:  Comparison study is 07/10/2018.    FINDINGS:  There is evidence of hyperventilatory changes of lungs secondary longstanding COPD.  Prominent apical pleural capping is again redemonstrated showing evidence of no further expansion as compared to previous.  Reticular linear densities are in contact with the left apical soft tissue density.  There is evidence of a small nodular foci abutting the right ankle prominence.  There is evidence of minimal perihilar scarring changes.  Confluent scarring is noted within the patient's left lung base.  The heart appears upper normal in size.  There is no significant pulmonary engorgement.  Small benign ossified density abuts the proximal humeral diaphysis on the right side.  There is no significant mediastinal expansion.  Elevated left hemidiaphragm is noted.  Heart normal in size.                                 Medical Decision Making:   ED Management:  Considerations include hypertensive emergency, acute coronary syndrome, COPD exacerbation, pneumonia, pneumothorax.  Patient's ER workup most consistent with COPD.  He also BNP is slightly elevated.  Patient will benefit from more blood pressure control.  He will receive IV hydralazine.  Patient benefit from serial cardiac enzymes and further cardiac workup including possible echo.  Patient given IV Solu-Medrol and breathing treatments in the emergency department he remains clinically stable.                   ED Course as of Aug 10 1504   Sat Aug 10, 2019   1446 AFib with PVCs no acute ST changes    [AP]   1502 Jyotsna Cliff will admit patient    [AP]      ED Course User Index  [AP] Keyshawn Ibarra MD     Clinical Impression:       ICD-10-CM ICD-9-CM   1. Pulmonary emphysema, unspecified emphysema type J43.9  492.8   2. Chest pain R07.9 786.50                                Keyshawn Ibarra MD  08/10/19 1456       Keyshawn Ibarra MD  08/10/19 1509

## 2019-08-10 NOTE — HPI
Patient presents to the ED with the complaint of chest pain and SOB that began on Thursday-2 days ago. He reports he first had SOB with mid-sternal chest tightness on Thursday evening. He went to bed and felt better on Friday. This morning, he began to have chest tightness again with SOB. The pain does not radiate. No aggravating or alleviating factors. He does report intermittent palpitations. He reports a history of COPD for which he is followed by . He denies fever,chills, productive cough, weakness, lightheadedness, dizziness, or LOC.

## 2019-08-10 NOTE — SUBJECTIVE & OBJECTIVE
Past Medical History:   Diagnosis Date    Arthritis     Finger Rt hand    Atrial fibrillation 2012, 2013    Cardioversion X2  Dr. Cortes, Cardiologist    BPH (benign prostatic hyperplasia)     COPD (chronic obstructive pulmonary disease)     Diabetes mellitus     Hypertension     Kidney mass     Migraine headache     Pneumothorax     25 years ago    Renal disorder     ONLY HAS RT KIDNEY    TB (pulmonary tuberculosis)        Past Surgical History:   Procedure Laterality Date    colonscopy      EYE SURGERY  2013    Bilateral cataracs      head surgery      growth removed    KIDNEY SURGERY      L kidney removed.     LUNG SURGERY      NEPHRECTOMY-RADICAL Left 1/6/2015    Performed by [facility]enu [facility] at [facility] OR    PROSTATE SURGERY      Varicele         Review of patient's allergies indicates:   Allergen Reactions    Hydrocodone Itching       No current facility-administered medications on file prior to encounter.      Current Outpatient Medications on File Prior to Encounter   Medication Sig    amlodipine (NORVASC) 5 MG tablet Take 5 mg by mouth once daily.    aspirin (ECOTRIN) 81 MG EC tablet Take 81 mg by mouth once daily.    digoxin (LANOXIN) 125 mcg tablet Take 125 mcg by mouth once daily.    fish oil-omega-3 fatty acids 300-1,000 mg capsule Take 2 g by mouth once daily.    fluticasone-umeclidin-vilanter (TRELEGY ELLIPTA) 100-62.5-25 mcg DsDv Inhale 1 puff into the lungs once daily.    gabapentin (NEURONTIN) 300 MG capsule bid    multivit, min cmb#20-iron-FA 27-1 mg Tab Take 1 tablet by mouth once daily.    potassium chloride (KLOR-CON) 10 MEQ TbSR 10 mEq once daily.     PRADAXA 75 mg Cap 75 mg 2 (two) times daily.     PROAIR HFA 90 mcg/actuation inhaler Inhale 1-2 puffs into the lungs every 4 (four) hours as needed for Wheezing.    simvastatin (ZOCOR) 20 MG tablet Take 20 mg by mouth every evening.    FLUZONE HIGH-DOSE 2018-19, PF, 180 mcg/0.5 mL vaccine ADM 0.5ML IM UTD     hydrocodone-guaifenesin 2.5-200 mg/5 mL Soln Take 10 mLs by mouth 4 (four) times daily as needed.    ONETOUCH DELICA LANCETS 33 gauge Misc TEST ONCE DAILY.    ONETOUCH VERIO Strp TEST ONCE DAILY.    [DISCONTINUED] HYDROcodone-acetaminophen (NORCO) 5-325 mg per tablet Take 1 tablet by mouth every 4 (four) hours as needed for Pain (cough).    [DISCONTINUED] HYDROcodone-acetaminophen (NORCO) 5-325 mg per tablet Take 1 tablet by mouth every 4 (four) hours as needed for Pain (cough).    [DISCONTINUED] HYDROcodone-acetaminophen (NORCO) 5-325 mg per tablet Take 1 tablet by mouth every 6 (six) hours. May use as needed    [DISCONTINUED] levoFLOXacin (LEVAQUIN) 500 MG tablet Take one pill a day for 5 days    [DISCONTINUED] predniSONE (DELTASONE) 20 MG tablet One daily for 7 days and repeat for bronchitis    [DISCONTINUED] tamsulosin (FLOMAX) 0.4 mg Cap Take 0.4 mg by mouth once daily.     Family History     Problem Relation (Age of Onset)    Kidney disease Father    Tuberculosis Father        Tobacco Use    Smoking status: Former Smoker     Last attempt to quit: 1969     Years since quittin.6    Smokeless tobacco: Never Used   Substance and Sexual Activity    Alcohol use: No    Drug use: No    Sexual activity: Not on file     Review of Systems   Constitutional: Positive for activity change. Negative for appetite change, chills, diaphoresis, fatigue and fever.   HENT: Negative for congestion, ear pain, facial swelling, hearing loss, nosebleeds, rhinorrhea, sore throat, tinnitus, trouble swallowing and voice change.    Eyes: Negative for photophobia and visual disturbance.   Respiratory: Positive for cough, chest tightness, shortness of breath and wheezing. Negative for choking.    Cardiovascular: Positive for chest pain and palpitations. Negative for leg swelling.   Gastrointestinal: Negative for abdominal distention, abdominal pain, blood in stool, constipation, diarrhea, nausea and vomiting.    Endocrine: Negative for polydipsia, polyphagia and polyuria.   Genitourinary: Negative for difficulty urinating, discharge, dysuria, flank pain, frequency, hematuria, scrotal swelling, testicular pain and urgency.   Musculoskeletal: Negative for gait problem, joint swelling, myalgias, neck pain and neck stiffness.   Skin: Negative for rash and wound.   Neurological: Negative for dizziness, tremors, seizures, syncope, facial asymmetry, speech difficulty, weakness, light-headedness, numbness and headaches.   Hematological: Negative for adenopathy. Does not bruise/bleed easily.   Psychiatric/Behavioral: Negative for confusion, hallucinations, self-injury and suicidal ideas. The patient is not nervous/anxious.      Objective:     Vital Signs (Most Recent):  Temp: 97.7 °F (36.5 °C) (08/10/19 1143)  Pulse: 73 (08/10/19 1631)  Resp: 16 (08/10/19 1631)  BP: (!) 174/81 (08/10/19 1631)  SpO2: 97 % (08/10/19 1631) Vital Signs (24h Range):  Temp:  [97.7 °F (36.5 °C)] 97.7 °F (36.5 °C)  Pulse:  [50-78] 73  Resp:  [11-28] 16  SpO2:  [94 %-98 %] 97 %  BP: (173-219)/() 174/81     Weight: 90.7 kg (200 lb)  Body mass index is 27.12 kg/m².    Physical Exam   Constitutional: He is oriented to person, place, and time. He appears well-developed and well-nourished. No distress.   HENT:   Head: Normocephalic and atraumatic.   Nose: Nose normal. No nasal deformity. No epistaxis.   Mouth/Throat: Uvula is midline, oropharynx is clear and moist and mucous membranes are normal. No dental caries. No oropharyngeal exudate or posterior oropharyngeal erythema.   Eyes: Pupils are equal, round, and reactive to light. Conjunctivae, EOM and lids are normal. Lids are everted and swept, no foreign bodies found. Right eye exhibits no discharge. Left eye exhibits no discharge.   Neck: Trachea normal, normal range of motion, full passive range of motion without pain and phonation normal. Neck supple. Normal carotid pulses, no hepatojugular reflux  and no JVD present. Carotid bruit is not present. No thyroid mass and no thyromegaly present.   Cardiovascular: S1 normal, S2 normal and normal pulses. An irregularly irregular rhythm present.   Murmur heard.  Pulmonary/Chest: Effort normal. No accessory muscle usage. No tachypnea. No respiratory distress. He has wheezes in the right upper field and the left upper field. He has no rhonchi. He has no rales.   Abdominal: Soft. Normal appearance and bowel sounds are normal. He exhibits no distension. There is no tenderness. No hernia.   Genitourinary: Testes normal.   Musculoskeletal: Normal range of motion. He exhibits no edema.   Lymphadenopathy:     He has no cervical adenopathy.     He has no axillary adenopathy.   Neurological: He is alert and oriented to person, place, and time. He has normal strength. He displays a negative Romberg sign. Coordination normal.   Skin: Skin is warm and dry. Capillary refill takes less than 2 seconds. No rash noted. There is erythema.        Psychiatric: He has a normal mood and affect. His speech is normal and behavior is normal. Judgment and thought content normal. Cognition and memory are normal.         CRANIAL NERVES     CN III, IV, VI   Pupils are equal, round, and reactive to light.  Extraocular motions are normal.        Significant Labs:   CBC:   Recent Labs   Lab 08/10/19  1204   WBC 9.31   HGB 15.9   HCT 48.4        CMP:   Recent Labs   Lab 08/10/19  1204      K 4.4      CO2 24   *   BUN 20   CREATININE 1.6*   CALCIUM 9.5   PROT 7.8   ALBUMIN 4.0   BILITOT 0.9   ALKPHOS 62   AST 28   ALT 18   ANIONGAP 12   EGFRNONAA 38.4*     Cardiac Markers:   Recent Labs   Lab 08/10/19  1204   *     Magnesium:   Recent Labs   Lab 08/10/19  1204   MG 2.1     Troponin:   Recent Labs   Lab 08/10/19  1204   TROPONINI <0.030       Significant Imaging: I have reviewed all pertinent imaging results/findings within the past 24 hours.

## 2019-08-11 ENCOUNTER — CLINICAL SUPPORT (OUTPATIENT)
Dept: CARDIOLOGY | Facility: HOSPITAL | Age: 84
End: 2019-08-11
Attending: EMERGENCY MEDICINE
Payer: MEDICARE

## 2019-08-11 VITALS
RESPIRATION RATE: 19 BRPM | HEART RATE: 64 BPM | DIASTOLIC BLOOD PRESSURE: 79 MMHG | BODY MASS INDEX: 27.43 KG/M2 | WEIGHT: 202.5 LBS | OXYGEN SATURATION: 94 % | SYSTOLIC BLOOD PRESSURE: 141 MMHG | HEIGHT: 72 IN | TEMPERATURE: 99 F

## 2019-08-11 PROBLEM — K21.9 GERD (GASTROESOPHAGEAL REFLUX DISEASE): Status: ACTIVE | Noted: 2019-08-11

## 2019-08-11 PROBLEM — J44.1 COPD EXACERBATION: Status: RESOLVED | Noted: 2019-04-15 | Resolved: 2019-08-11

## 2019-08-11 PROBLEM — R07.9 CHEST PAIN: Status: RESOLVED | Noted: 2019-08-10 | Resolved: 2019-08-11

## 2019-08-11 LAB
AORTIC ROOT ANNULUS: 3.93 CM
AORTIC VALVE CUSP SEPERATION: 0.94 CM
AV INDEX (PROSTH): 0.46
AV MEAN GRADIENT: 19 MMHG
AV PEAK GRADIENT: 34 MMHG
AV VALVE AREA: 1.48 CM2
AV VELOCITY RATIO: 44.34
BASOPHILS # BLD AUTO: 0.01 K/UL (ref 0–0.2)
BASOPHILS NFR BLD: 0.1 % (ref 0–1.9)
BSA FOR ECHO PROCEDURE: 2.14 M2
CK MB SERPL-MCNC: 1.7 NG/ML (ref 0.1–6.5)
CK MB SERPL-MCNC: 1.7 NG/ML (ref 0.1–6.5)
CV ECHO LV RWT: 0.41 CM
DIFFERENTIAL METHOD: ABNORMAL
DOP CALC AO PEAK VEL: 2.9 M/S
DOP CALC AO VTI: 55.22 CM
DOP CALC LVOT AREA: 3.2 CM2
DOP CALC LVOT DIAMETER: 2.02 CM
DOP CALC LVOT PEAK VEL: 128.58 M/S
DOP CALC LVOT STROKE VOLUME: 81.74 CM3
DOP CALCLVOT PEAK VEL VTI: 25.52 CM
E WAVE DECELERATION TIME: 199.75 MSEC
E/A RATIO: 3.16
E/E' RATIO: 13.33 M/S
ECHO LV POSTERIOR WALL: 1.12 CM (ref 0.6–1.1)
EOSINOPHIL # BLD AUTO: 0 K/UL (ref 0–0.5)
EOSINOPHIL NFR BLD: 0 % (ref 0–8)
ERYTHROCYTE [DISTWIDTH] IN BLOOD BY AUTOMATED COUNT: 13.2 % (ref 11.5–14.5)
FRACTIONAL SHORTENING: 38 % (ref 28–44)
GLUCOSE SERPL-MCNC: 168 MG/DL (ref 70–110)
GLUCOSE SERPL-MCNC: 179 MG/DL (ref 70–110)
GLUCOSE SERPL-MCNC: 215 MG/DL (ref 70–110)
HCT VFR BLD AUTO: 46.6 % (ref 40–54)
HGB BLD-MCNC: 15.3 G/DL (ref 14–18)
IMM GRANULOCYTES # BLD AUTO: 0.03 K/UL (ref 0–0.04)
IMM GRANULOCYTES NFR BLD AUTO: 0.4 % (ref 0–0.5)
INTERVENTRICULAR SEPTUM: 1.13 CM (ref 0.6–1.1)
LEFT ATRIUM SIZE: 3.97 CM
LEFT INTERNAL DIMENSION IN SYSTOLE: 3.39 CM (ref 2.1–4)
LEFT VENTRICLE MASS INDEX: 116 G/M2
LEFT VENTRICULAR INTERNAL DIMENSION IN DIASTOLE: 5.49 CM (ref 3.5–6)
LEFT VENTRICULAR MASS: 248.72 G
LV LATERAL E/E' RATIO: 12 M/S
LV SEPTAL E/E' RATIO: 15 M/S
LYMPHOCYTES # BLD AUTO: 0.5 K/UL (ref 1–4.8)
LYMPHOCYTES NFR BLD: 7.6 % (ref 18–48)
MCH RBC QN AUTO: 30.3 PG (ref 27–31)
MCHC RBC AUTO-ENTMCNC: 32.8 G/DL (ref 32–36)
MCV RBC AUTO: 92 FL (ref 82–98)
MONOCYTES # BLD AUTO: 0.1 K/UL (ref 0.3–1)
MONOCYTES NFR BLD: 1 % (ref 4–15)
MV PEAK A VEL: 0.38 M/S
MV PEAK E VEL: 1.2 M/S
NEUTROPHILS # BLD AUTO: 6.4 K/UL (ref 1.8–7.7)
NEUTROPHILS NFR BLD: 90.9 % (ref 38–73)
NRBC BLD-RTO: 0 /100 WBC
PISA TR MAX VEL: 2.84 M/S
PLATELET # BLD AUTO: 234 K/UL (ref 150–350)
PMV BLD AUTO: 9.3 FL (ref 9.2–12.9)
PV PEAK VELOCITY: 121 CM/S
RBC # BLD AUTO: 5.05 M/UL (ref 4.6–6.2)
RIGHT VENTRICULAR END-DIASTOLIC DIMENSION: 282 CM
TDI LATERAL: 0.1 M/S
TDI SEPTAL: 0.08 M/S
TDI: 0.09 M/S
TR MAX PG: 32 MMHG
TROPONIN I SERPL DL<=0.01 NG/ML-MCNC: <0.03 NG/ML (ref 0.02–0.04)
WBC # BLD AUTO: 7.08 K/UL (ref 3.9–12.7)

## 2019-08-11 PROCEDURE — 96376 TX/PRO/DX INJ SAME DRUG ADON: CPT | Mod: 59

## 2019-08-11 PROCEDURE — 85025 COMPLETE CBC W/AUTO DIFF WBC: CPT

## 2019-08-11 PROCEDURE — 27000221 HC OXYGEN, UP TO 24 HOURS

## 2019-08-11 PROCEDURE — 82553 CREATINE MB FRACTION: CPT

## 2019-08-11 PROCEDURE — 25000003 PHARM REV CODE 250: Performed by: INTERNAL MEDICINE

## 2019-08-11 PROCEDURE — G0378 HOSPITAL OBSERVATION PER HR: HCPCS

## 2019-08-11 PROCEDURE — 99222 PR INITIAL HOSPITAL CARE,LEVL II: ICD-10-PCS | Mod: ,,, | Performed by: INTERNAL MEDICINE

## 2019-08-11 PROCEDURE — 36415 COLL VENOUS BLD VENIPUNCTURE: CPT

## 2019-08-11 PROCEDURE — 93005 ELECTROCARDIOGRAM TRACING: CPT

## 2019-08-11 PROCEDURE — 25000003 PHARM REV CODE 250: Performed by: NURSE PRACTITIONER

## 2019-08-11 PROCEDURE — 63600175 PHARM REV CODE 636 W HCPCS: Performed by: NURSE PRACTITIONER

## 2019-08-11 PROCEDURE — 99222 1ST HOSP IP/OBS MODERATE 55: CPT | Mod: ,,, | Performed by: INTERNAL MEDICINE

## 2019-08-11 PROCEDURE — 93306 TTE W/DOPPLER COMPLETE: CPT

## 2019-08-11 PROCEDURE — 94761 N-INVAS EAR/PLS OXIMETRY MLT: CPT

## 2019-08-11 PROCEDURE — 82962 GLUCOSE BLOOD TEST: CPT

## 2019-08-11 RX ORDER — AMLODIPINE BESYLATE 5 MG/1
10 TABLET ORAL DAILY
Status: DISCONTINUED | OUTPATIENT
Start: 2019-08-11 | End: 2019-08-11 | Stop reason: HOSPADM

## 2019-08-11 RX ORDER — AMLODIPINE BESYLATE 5 MG/1
5 TABLET ORAL DAILY
Qty: 30 TABLET | Refills: 0 | Status: SHIPPED | OUTPATIENT
Start: 2019-08-11 | End: 2020-10-27 | Stop reason: SDUPTHER

## 2019-08-11 RX ORDER — FAMOTIDINE 20 MG/1
20 TABLET, FILM COATED ORAL 2 TIMES DAILY
Status: DISCONTINUED | OUTPATIENT
Start: 2019-08-11 | End: 2019-08-11 | Stop reason: HOSPADM

## 2019-08-11 RX ORDER — HYDRALAZINE HYDROCHLORIDE 25 MG/1
25 TABLET, FILM COATED ORAL EVERY 8 HOURS PRN
Status: DISCONTINUED | OUTPATIENT
Start: 2019-08-11 | End: 2019-08-11 | Stop reason: HOSPADM

## 2019-08-11 RX ORDER — HYDROGEN PEROXIDE 3 %
20 SOLUTION, NON-ORAL MISCELLANEOUS
Qty: 30 CAPSULE | Refills: 0 | Status: SHIPPED | OUTPATIENT
Start: 2019-08-11 | End: 2021-02-23

## 2019-08-11 RX ADMIN — GABAPENTIN 300 MG: 300 CAPSULE ORAL at 10:08

## 2019-08-11 RX ADMIN — FAMOTIDINE 20 MG: 20 TABLET ORAL at 06:08

## 2019-08-11 RX ADMIN — DIGOXIN 125 MCG: 125 TABLET ORAL at 10:08

## 2019-08-11 RX ADMIN — METHYLPREDNISOLONE SODIUM SUCCINATE 40 MG: 40 INJECTION, POWDER, FOR SOLUTION INTRAMUSCULAR; INTRAVENOUS at 03:08

## 2019-08-11 RX ADMIN — ASPIRIN 81 MG: 81 TABLET, COATED ORAL at 10:08

## 2019-08-11 RX ADMIN — METHYLPREDNISOLONE SODIUM SUCCINATE 40 MG: 40 INJECTION, POWDER, FOR SOLUTION INTRAMUSCULAR; INTRAVENOUS at 06:08

## 2019-08-11 RX ADMIN — DABIGATRAN ETEXILATE MESYLATE 75 MG: 75 CAPSULE ORAL at 10:08

## 2019-08-11 RX ADMIN — POTASSIUM CHLORIDE 10 MEQ: 750 CAPSULE, EXTENDED RELEASE ORAL at 10:08

## 2019-08-11 RX ADMIN — AMLODIPINE BESYLATE 10 MG: 5 TABLET ORAL at 10:08

## 2019-08-11 NOTE — DISCHARGE SUMMARY
[facility] Medicine  Discharge Summary      Patient Name: Vazquez Vegas  MRN: [id number]  Admission Date: 8/10/2019  Hospital Length of Stay: 0 days  Discharge Date and Time: 8/11/2019  7:11 PM  Attending Physician: No att. providers found   Discharging Provider: Olivia Kyle MD  Primary Care Provider: Eddie Cortes MD    DISCHARGE DIAGNOSES:    Atypical chest pain likely related to GERD-      Hospital Course:   Patient admitted with episode of atypical chest pain, resolved on its own prior to presentation.  Acute coronary syndrome was ruled out.  EKG without ischemic changes.  Patient was evaluated by Cardiology and felt this is related to the GERD.  Patient is eager to go home.  Patient is discharged with Nexium.    Consults:   Consults (From admission, onward)        Status Ordering Provider     Inpatient consult to Cardiology  Once     Provider:  Eddie Cortes MD    Completed DEMETRIO LAYTON     Inpatient consult to Pulmonology  Once     Provider:  Bharathi Cannon MD    Completed DEMETRIO LAYTON          DISCHARGE DAY EXAM:  Physical exam on the day of discharge:  General: Patient resting comfortably in no acute distress.  Lungs: CTA. Good air entry.  Cor: Regular rate and rhythm. No murmurs. No pedal edema.  Abd: Soft. Nontender. Non-distended.  Neuro: A&O x3. Moving all 4 extremities equally  Ext: No clubbing. No cyanosis.     Final Active Diagnoses:    Diagnosis Date Noted POA    PRINCIPAL PROBLEM:  GERD (gastroesophageal reflux disease) [K21.9] 08/11/2019 Yes    HTN (hypertension) [I10] 01/24/2015 Yes    Atrial fibrillation [I48.91] 01/24/2015 Yes      Problems Resolved During this Admission:    Diagnosis Date Noted Date Resolved POA    Chest pain [R07.9] 08/10/2019 08/11/2019 Yes    COPD exacerbation [J44.1] 04/15/2019 08/11/2019 Yes      Discharged Condition: good    Disposition: Home or Self Care    Follow Up:  Follow-up Information     Eddie  ALESHA Cortes MD. Schedule an appointment as soon as possible for a visit in 1 week.    Specialties:  Cardiovascular Disease, Cardiology  Contact information:  [facility]  [address]  CARDIOLOGY [facility] [address]  [phone]                 Patient Instructions:      Activity as tolerated     Medications:  Reconciled Home Medications:      Medication List      START taking these medications    esomeprazole 20 MG capsule  Commonly known as:  NEXIUM  Take 1 capsule (20 mg total) by mouth before breakfast.        CONTINUE taking these medications    amLODIPine 5 MG tablet  Commonly known as:  NORVASC  Take 1 tablet (5 mg total) by mouth once daily.     aspirin 81 MG EC tablet  Commonly known as:  ECOTRIN  Take 81 mg by mouth once daily.     digoxin 125 mcg tablet  Commonly known as:  LANOXIN  Take 125 mcg by mouth once daily.     fish oil-omega-3 fatty acids 300-1,000 mg capsule  Take 2 g by mouth once daily.     fluticasone-umeclidin-vilanter 100-62.5-25 mcg Dsdv  Commonly known as:  TRELEGY ELLIPTA  Inhale 1 puff into the lungs once daily.     FLUZONE HIGH-DOSE 2018-19 (PF) 180 mcg/0.5 mL vaccine  Generic drug:  influenza  ADM 0.5ML IM UTD     gabapentin 300 MG capsule  Commonly known as:  NEURONTIN  bid     hydrocodone-guaifenesin 2.5-200 mg/5 mL Soln  Take 10 mLs by mouth 4 (four) times daily as needed.     multivit, min no.20-iron-folic 27 mg iron- 1 mg Tab  Commonly known as:  BACMIN  Take 1 tablet by mouth once daily.     ONETOUCH DELICA LANCETS 33 gauge Misc  Generic drug:  lancets  TEST ONCE DAILY.     ONETOUCH VERIO Strp  Generic drug:  blood sugar diagnostic  TEST ONCE DAILY.     potassium chloride 10 MEQ Tbsr  Commonly known as:  KLOR-CON  10 mEq once daily.     PRADAXA 75 mg Cap  Generic drug:  dabigatran etexilate  75 mg 2 (two) times daily.     PROAIR HFA 90 mcg/actuation inhaler  Generic drug:  albuterol  Inhale 1-2 puffs into the lungs every 4 (four) hours as needed for Wheezing.      simvastatin 20 MG tablet  Commonly known as:  ZOCOR  Take 20 mg by mouth every evening.            Significant Diagnostic Studies:  None    Pending Diagnostic Studies:     None        Indwelling Lines/Drains at time of discharge:   Lines/Drains/Airways          None          Time spent on the discharge of patient: 40 minutes  Patient was seen and examined on the date of discharge and determined to be suitable for discharge.         Olivia Kyle MD  Department of Hospital Medicine  [facility]

## 2019-08-11 NOTE — CARE UPDATE
08/10/19 2110   Patient Assessment/Suction   All Lung Fields Breath Sounds clear   PRE-TX-O2   O2 Device (Oxygen Therapy) nasal cannula   Flow (L/min) 2   SpO2 98 %   Pulse Oximetry Type Intermittent   Pulse 85   Resp 16   Aerosol Therapy   $ Aerosol Therapy Charges Aerosol Treatment   Daily Review of Necessity (SVN) completed   Respiratory Treatment Status (SVN) given   Treatment Route (SVN) mask   Patient Position (SVN) HOB elevated   Post Treatment Assessment (SVN) breath sounds improved   Signs of Intolerance (SVN) none   Breath Sounds Post-Respiratory Treatment   Throughout All Fields Post-Treatment All Fields   Throughout All Fields Post-Treatment clear   Post-treatment Heart Rate (beats/min) 78   Post-treatment Resp Rate (breaths/min) 20

## 2019-08-11 NOTE — CONSULTS
[facility]  Pulmonology  Consult Note    Patient Name: Vazquez Vegas  MRN: [id number]  Admission Date: 8/10/2019  Hospital Length of Stay: 0 days  Code Status: Full Code  Attending Physician: Olivia Kyle MD  Primary Care Provider: Eddie Cortes MD   Principal Problem: <principal problem not specified>    Inpatient consult to Pulmonology  Consult performed by: Bharathi Cannon MD  Consult ordered by: Jyotsna Coronado NP  Reason for consult: COPD        Subjective:     HPI:  85 yo male, pt of Dr Mendez, admitted with episode of epigastric chest discomfort and some increased SOB.  No fever, chills, sough, sputum, wheezing.  Feels much better today and is hoping to go home.  ROS as below.    Past Medical History:   Diagnosis Date    Arthritis     Finger Rt hand    Atrial fibrillation ,     Cardioversion X2  Dr. Cortes, Cardiologist    BPH (benign prostatic hyperplasia)     COPD (chronic obstructive pulmonary disease)     Diabetes mellitus     Hypertension     Kidney mass     Migraine headache     Pneumothorax     25 years ago    Renal disorder     ONLY HAS RT KIDNEY    TB (pulmonary tuberculosis)        Past Surgical History:   Procedure Laterality Date    colonscopy      EYE SURGERY      Bilateral cataracs      head surgery      growth removed    KIDNEY SURGERY      L kidney removed.     LUNG SURGERY      NEPHRECTOMY-RADICAL Left 2015    Performed by [facility]enu [facility] at [facility] OR    PROSTATE SURGERY      Varicele         Review of patient's allergies indicates:   Allergen Reactions    Hydrocodone Itching       Family History     Problem Relation (Age of Onset)    Kidney disease Father    Tuberculosis Father        Tobacco Use    Smoking status: Former Smoker     Last attempt to quit: 1969     Years since quittin.6    Smokeless tobacco: Never Used   Substance and Sexual Activity    Alcohol use: No    Drug use: No    Sexual  activity: Not on file         Review of Systems   Constitutional: Positive for fatigue. Negative for activity change, chills, diaphoresis and fever.   HENT: Negative for congestion, postnasal drip, rhinorrhea, sinus pressure, sneezing and sore throat.    Eyes: Negative for visual disturbance.   Respiratory: Positive for shortness of breath. Negative for apnea, cough, choking, chest tightness, wheezing and stridor.    Cardiovascular: Positive for chest pain (epigastric). Negative for palpitations and leg swelling.   Gastrointestinal: Negative for abdominal pain, constipation, diarrhea, nausea and vomiting.        Denies reflux   Genitourinary: Negative for dysuria, frequency and urgency.   Musculoskeletal: Negative for arthralgias.   Skin: Negative for rash.   Neurological: Negative for dizziness, syncope and weakness.   Psychiatric/Behavioral: Negative for behavioral problems and sleep disturbance. The patient is not nervous/anxious.      Objective:     Vital Signs (Most Recent):  Temp: 97.7 °F (36.5 °C) (08/11/19 0728)  Pulse: 66 (08/11/19 0850)  Resp: 16 (08/11/19 0850)  BP: (!) 176/85(meds given) (08/11/19 1015)  SpO2: 98 % (08/11/19 0850) Vital Signs (24h Range):  Temp:  [97.4 °F (36.3 °C)-98 °F (36.7 °C)] 97.7 °F (36.5 °C)  Pulse:  [57-85] 66  Resp:  [11-23] 16  SpO2:  [95 %-98 %] 98 %  BP: (138-193)/(71-90) 176/85     Weight: 91.8 kg (202 lb 7.9 oz)  Body mass index is 27.46 kg/m².      Intake/Output Summary (Last 24 hours) at 8/11/2019 1513  Last data filed at 8/11/2019 0500  Gross per 24 hour   Intake --   Output 500 ml   Net -500 ml       Physical Exam   Constitutional: He is oriented to person, place, and time. He appears well-developed and well-nourished. No distress.   HENT:   Head: Normocephalic and atraumatic.   Nose: Nose normal.   Mouth/Throat: Oropharynx is clear and moist.   Eyes: Pupils are equal, round, and reactive to light. EOM are normal.   Neck: Normal range of motion. Neck supple. No JVD  present. No tracheal deviation present. No thyromegaly present.   No bruits   Cardiovascular: Normal rate, regular rhythm and intact distal pulses. Exam reveals no gallop and no friction rub.   Murmur heard.  Pulmonary/Chest: Effort normal and breath sounds normal. No stridor. No respiratory distress. He has no wheezes. He has no rales. He exhibits no tenderness.   Good air movement   Abdominal: Soft. Bowel sounds are normal. He exhibits no distension.   Musculoskeletal: Normal range of motion. He exhibits no edema or tenderness.   Lymphadenopathy:     He has no cervical adenopathy.   Neurological: He is alert and oriented to person, place, and time. He has normal reflexes. No cranial nerve deficit.   Skin: Skin is warm and dry. He is not diaphoretic.   Psychiatric: He has a normal mood and affect. His behavior is normal.   Nursing note and vitals reviewed.      Vents:       Lines/Drains/Airways     Peripheral Intravenous Line                 Peripheral IV - Single Lumen 08/10/19 1150 20 G Right Hand 1 day                Significant Labs:    CBC/Anemia Profile:  Recent Labs   Lab 08/10/19  1204 08/11/19  0421   WBC 9.31 7.08   HGB 15.9 15.3   HCT 48.4 46.6    234   MCV 91 92   RDW 13.1 13.2        Chemistries:  Recent Labs   Lab 08/10/19  1204      K 4.4      CO2 24   BUN 20   CREATININE 1.6*   CALCIUM 9.5   ALBUMIN 4.0   PROT 7.8   BILITOT 0.9   ALKPHOS 62   ALT 18   AST 28   MG 2.1       Troponin:   Recent Labs   Lab 08/10/19  1204 08/10/19  1759 08/10/19  2337   TROPONINI <0.030 <0.030 <0.030     All pertinent labs within the past 24 hours have been reviewed.    Significant Imaging:   I have reviewed and interpreted all pertinent imaging results/findings within the past 24 hours.     CXR - COPD, no acute changes    Vent. Rate : 060 BPM     Atrial Rate : 234 BPM     P-R Int : 000 ms          QRS Dur : 100 ms      QT Int : 400 ms       P-R-T Axes : 000 -51 025 degrees     QTc Int : 400  ms    Atrial fibrillation with premature ventricular or aberrantly conducted  complexes  Left axis deviation  Inferior infarct (cited on or before 29-DEC-2014)  Anteroseptal infarct (cited on or before 29-DEC-2014)  Abnormal ECG  When compared with ECG of 10-AUG-2019 11:43,  ST no longer depressed in Lateral leads  Nonspecific T wave abnormality now evident in Inferior leads  Nonspecific T wave abnormality, worse in Lateral leads    ECHO - pending    Assessment/Plan:     COPD exacerbation  · Much better today  · OK to DC home from pulmonary standpoint  · Should follow up with Dr Mendez in 1-2 weeks    Chest pain  · Epigastric now resolved    HTN (hypertension)  · Needs better control    Atrial fibrillation  · Rate better controlled  · Sounded regular when I examined him  · Per cardiology          Thank you for your consult. I will follow-up with patient. Please contact us if you have any additional questions.     Bharathi Cannon MD  Pulmonology  [facility]

## 2019-08-11 NOTE — PLAN OF CARE
Problem: Fall Injury Risk  Goal: Absence of Fall and Fall-Related Injury  Outcome: Outcome(s) achieved Date Met: 08/11/19  Pt remained free of falls or injury this shift

## 2019-08-11 NOTE — CONSULTS
LOS: 0 days     Subjective:  Mr. Vegas  86-year-old gentleman woke up early Saturday morning cyst on the TV and started to feel a discomfort in the chest.  He did have some the previous night as well he got concerned and asked his wife to bring him to the hospital.  Since admission he has had no recurrence thinking back he strongly things he had some indigestion.  I reviewed his past medical records and cardiac evaluation extensively.  Angiogram performed in 2017 demonstrates mild coronary artery disease.  He is known to have permanent atrial fibrillation.  He is known to have COPD.    Scheduled Meds:   amLODIPine  10 mg Oral Daily    aspirin  81 mg Oral Daily    dabigatran etexilate  75 mg Oral BID    digoxin  125 mcg Oral Daily    famotidine  20 mg Oral BID    fluticasone-umeclidin-vilanter  1 puff Inhalation Daily    gabapentin  300 mg Oral BID    methylPREDNISolone sodium succinate  40 mg Intravenous Q8H    multivitamin  1 tablet Oral Daily    potassium chloride  10 mEq Oral Daily    simvastatin  20 mg Oral QHS     Continuous Infusions:  PRN Meds:acetaminophen, dextrose 50%, dextrose 50%, GI cocktail (mylanta 30 mL, lidocaine 2 % viscous 10 mL, dicyclomine 10 mL) 50 mL, hydrALAZINE, hydrALAZINE, insulin aspart U-100, ipratropium, levalbuterol, ondansetron, senna-docusate 8.6-50 mg, sodium chloride 0.9%    Objective:   Vital signs in last 24 hours:  Temp:  [97.4 °F (36.3 °C)-98.6 °F (37 °C)] 98.6 °F (37 °C)  Pulse:  [57-85] 64  Resp:  [16-19] 19  SpO2:  [94 %-98 %] 94 %  BP: (138-193)/(76-90) 141/79    Intake/Output last 3 shifts:  I/O last 3 completed shifts:  In: -   Out: 500 [Urine:500]  Intake/Output this shift:  No intake/output data recorded.    Physical Exam:  Lungs: diminished breath sounds bibasilar and wheezes bibasilar  Heart: irregularly irregular, rate controlled.  Abdomen: normal findings: bowel sounds normal    Cardiographics  ECG:  .  Echocardiogram: Normal LVEF, 4 chamber  dilatation.    Imaging  Chest X-Ray: air trapping/emphysema     Lab Review  Lab Results   Component Value Date    WBC 7.08 08/11/2019    HGB 15.3 08/11/2019    HCT 46.6 08/11/2019    MCV 92 08/11/2019     08/11/2019       Assessment/Plan:  Atypical chest pain most likely related to gastroesophageal reflux.  Hypertensive heart disease.  Permanent atrial fibrillation  COPD  After a long discussion with the patient and his family members I feel it is reasonable to discharge him as no evidence of acute coronary syndrome has been found.  His COPD seems to be at baseline.  I suggested over-the-counter Nexium 20 mg 1 daily for at least 2 weeks if he has no relief he needs to see a gastroenterologist.  I have suggested he follow up with his usual cardiologist pulmonologists and GP.    Active Hospital Problems    Diagnosis  POA    Chest pain [R07.9]  Unknown    Open toe wound, initial encounter [S91.109A]  Unknown    COPD exacerbation [J44.1]  Yes    HTN (hypertension) [I10]  Yes    Atrial fibrillation [I48.91]  Yes      Resolved Hospital Problems   No resolved problems to display.

## 2019-08-11 NOTE — SUBJECTIVE & OBJECTIVE
Past Medical History:   Diagnosis Date    Arthritis     Finger Rt hand    Atrial fibrillation ,     Cardioversion X2  Dr. Cortes, Cardiologist    BPH (benign prostatic hyperplasia)     COPD (chronic obstructive pulmonary disease)     Diabetes mellitus     Hypertension     Kidney mass     Migraine headache     Pneumothorax     25 years ago    Renal disorder     ONLY HAS RT KIDNEY    TB (pulmonary tuberculosis)        Past Surgical History:   Procedure Laterality Date    colonscopy      EYE SURGERY      Bilateral cataracs      head surgery      growth removed    KIDNEY SURGERY      L kidney removed.     LUNG SURGERY      NEPHRECTOMY-RADICAL Left 2015    [facility] by [facility]    PROSTATE SURGERY      Varicele         Review of patient's allergies indicates:   Allergen Reactions    Hydrocodone Itching       Family History     Problem Relation (Age of Onset)    Kidney disease Father    Tuberculosis Father        Tobacco Use    Smoking status: Former Smoker     Last attempt to quit: 1969     Years since quittin.6    Smokeless tobacco: Never Used   Substance and Sexual Activity    Alcohol use: No    Drug use: No    Sexual activity: Not on file         Review of Systems   Constitutional: Positive for fatigue. Negative for activity change, chills, diaphoresis and fever.   HENT: Negative for congestion, postnasal drip, rhinorrhea, sinus pressure, sneezing and sore throat.    Eyes: Negative for visual disturbance.   Respiratory: Positive for shortness of breath. Negative for apnea, cough, choking, chest tightness, wheezing and stridor.    Cardiovascular: Positive for chest pain (epigastric). Negative for palpitations and leg swelling.   Gastrointestinal: Negative for abdominal pain, constipation, diarrhea, nausea and vomiting.        Denies reflux   Genitourinary: Negative for dysuria, frequency and urgency.   Musculoskeletal: Negative for  arthralgias.   Skin: Negative for rash.   Neurological: Negative for dizziness, syncope and weakness.   Psychiatric/Behavioral: Negative for behavioral problems and sleep disturbance. The patient is not nervous/anxious.      Objective:     Vital Signs (Most Recent):  Temp: 97.7 °F (36.5 °C) (08/11/19 0728)  Pulse: 66 (08/11/19 0850)  Resp: 16 (08/11/19 0850)  BP: (!) 176/85(meds given) (08/11/19 1015)  SpO2: 98 % (08/11/19 0850) Vital Signs (24h Range):  Temp:  [97.4 °F (36.3 °C)-98 °F (36.7 °C)] 97.7 °F (36.5 °C)  Pulse:  [57-85] 66  Resp:  [11-23] 16  SpO2:  [95 %-98 %] 98 %  BP: (138-193)/(71-90) 176/85     Weight: 91.8 kg (202 lb 7.9 oz)  Body mass index is 27.46 kg/m².      Intake/Output Summary (Last 24 hours) at 8/11/2019 1513  Last data filed at 8/11/2019 0500  Gross per 24 hour   Intake --   Output 500 ml   Net -500 ml       Physical Exam   Constitutional: He is oriented to person, place, and time. He appears well-developed and well-nourished. No distress.   HENT:   Head: Normocephalic and atraumatic.   Nose: Nose normal.   Mouth/Throat: Oropharynx is clear and moist.   Eyes: Pupils are equal, round, and reactive to light. EOM are normal.   Neck: Normal range of motion. Neck supple. No JVD present. No tracheal deviation present. No thyromegaly present.   No bruits   Cardiovascular: Normal rate, regular rhythm and intact distal pulses. Exam reveals no gallop and no friction rub.   Murmur heard.  Pulmonary/Chest: Effort normal and breath sounds normal. No stridor. No respiratory distress. He has no wheezes. He has no rales. He exhibits no tenderness.   Good air movement   Abdominal: Soft. Bowel sounds are normal. He exhibits no distension.   Musculoskeletal: Normal range of motion. He exhibits no edema or tenderness.   Lymphadenopathy:     He has no cervical adenopathy.   Neurological: He is alert and oriented to person, place, and time. He has normal reflexes. No cranial nerve deficit.   Skin: Skin is warm  and dry. He is not diaphoretic.   Psychiatric: He has a normal mood and affect. His behavior is normal.   Nursing note and vitals reviewed.      Vents:       Lines/Drains/Airways     Peripheral Intravenous Line                 Peripheral IV - Single Lumen 08/10/19 1150 20 G Right Hand 1 day                Significant Labs:    CBC/Anemia Profile:  Recent Labs   Lab 08/10/19  1204 08/11/19  0421   WBC 9.31 7.08   HGB 15.9 15.3   HCT 48.4 46.6    234   MCV 91 92   RDW 13.1 13.2        Chemistries:  Recent Labs   Lab 08/10/19  1204      K 4.4      CO2 24   BUN 20   CREATININE 1.6*   CALCIUM 9.5   ALBUMIN 4.0   PROT 7.8   BILITOT 0.9   ALKPHOS 62   ALT 18   AST 28   MG 2.1       Troponin:   Recent Labs   Lab 08/10/19  1204 08/10/19  1759 08/10/19  2337   TROPONINI <0.030 <0.030 <0.030     All pertinent labs within the past 24 hours have been reviewed.    Significant Imaging:   I have reviewed and interpreted all pertinent imaging results/findings within the past 24 hours.     CXR - COPD, no acute changes    Vent. Rate : 060 BPM     Atrial Rate : 234 BPM     P-R Int : 000 ms          QRS Dur : 100 ms      QT Int : 400 ms       P-R-T Axes : 000 -51 025 degrees     QTc Int : 400 ms    Atrial fibrillation with premature ventricular or aberrantly conducted  complexes  Left axis deviation  Inferior infarct (cited on or before 29-DEC-2014)  Anteroseptal infarct (cited on or before 29-DEC-2014)  Abnormal ECG  When compared with ECG of 10-AUG-2019 11:43,  ST no longer depressed in Lateral leads  Nonspecific T wave abnormality now evident in Inferior leads  Nonspecific T wave abnormality, worse in Lateral leads    ECHO - pending

## 2019-08-11 NOTE — PROGRESS NOTES
[facility] Medicine  Progress Note    Patient Name: Vazquez Vegas  MRN: [id number]  Patient Class: OP- Observation   Admission Date: 8/10/2019  Length of Stay: 0 days  Attending Physician: Olivia Kyle MD  Primary Care Provider: Eddie Cortes MD        Subjective:       Interval History:    Patient reported history of chest discomfort/tightness 2 days ago lasted for an hour or so associated with palpitations, subside on its own.  Patient's symptoms started back again yesterday morning, associated with shortness of breath.  Patient reports symptoms have subsided after admission to the hospital.  Currently patient denies any chest pain, shortness of breath, palpitations, cough, fevers, chills.  Three sets of troponins are normal.  Initial EKG shows ST depressions in lateral leads but repeat EKG showed normalization.  Patient reports angiogram in 2017, no records available, reports no blockages.    Review of Systems  Objective:     Vital Signs (Most Recent):  Temp: 98.6 °F (37 °C) (08/11/19 1543)  Pulse: 64 (08/11/19 1543)  Resp: 19 (08/11/19 1543)  BP: (!) 141/79 (08/11/19 1543)  SpO2: (!) 94 % (08/11/19 1543) Vital Signs (24h Range):  Temp:  [97.4 °F (36.3 °C)-98.6 °F (37 °C)] 98.6 °F (37 °C)  Pulse:  [57-85] 64  Resp:  [16-23] 19  SpO2:  [94 %-98 %] 94 %  BP: (138-193)/(71-90) 141/79     Weight: 91.8 kg (202 lb 7.9 oz)  Body mass index is 27.46 kg/m².    Intake/Output Summary (Last 24 hours) at 8/11/2019 1704  Last data filed at 8/11/2019 0500  Gross per 24 hour   Intake --   Output 500 ml   Net -500 ml        Physical Exam:  General: Patient resting comfortably in no acute distress.  Lungs: CTA. Good air entry.  Cor: Irregular rate and rhythm. No murmurs. No pedal edema.  Abd: Soft. Nontender. Non-distended.  Neuro: A&O x3. Moving all 4 extremities equally  Ext: No clubbing. No cyanosis.     Significant Labs:   CBC:   Recent Labs   Lab 08/10/19  1204 08/11/19  0421   WBC  9.31 7.08   HGB 15.9 15.3   HCT 48.4 46.6    234         Assessment/Plan:      Suspect cardiac angina versus chest pain from hypertensive urgency versus episode of AFib RVR versus GI origin    Hypertensive urgency    A.fib with controlled rate currently, anticoagulated with pradaxa    COPD - stable    Solitary kidney from h/o nephrectomy for cancer    MARSHALL vs suspect CKD given solitary kidney    PLAN:  Increase norvasc to 10 mg  Start pepcid 20 mg BID  PRN GI cocktail  F/u echo results  Can wean off steroids quickly  F/u cardio recommendations  Continue aspirin, HR in 60s without betablockers  Add hydralazine 25 mg TID for SBP >140      Active Diagnoses:    Diagnosis Date Noted POA    Chest pain [R07.9] 08/10/2019 Unknown    Open toe wound, initial encounter [S91.109A] 08/10/2019 Unknown    COPD exacerbation [J44.1] 04/15/2019 Yes    HTN (hypertension) [I10] 01/24/2015 Yes    Atrial fibrillation [I48.91] 01/24/2015 Yes      Problems Resolved During this Admission:     VTE Risk Mitigation (From admission, onward)        Ordered     dabigatran etexilate capsule 75 mg  2 times daily      08/10/19 1818     IP VTE HIGH RISK PATIENT  Once      08/10/19 1818     Reason for No Pharmacological VTE Prophylaxis  Once      08/10/19 1818             Olivia Kyle MD  Department of Hospital Medicine   [facility]

## 2019-08-11 NOTE — PLAN OF CARE
08/11/19 0850   Patient Assessment/Suction   Level of Consciousness (AVPU) alert   Respiratory Effort Normal;Unlabored   Expansion/Accessory Muscles/Retractions no retractions;no use of accessory muscles   All Lung Fields Breath Sounds clear   PRE-TX-O2   O2 Device (Oxygen Therapy) nasal cannula   $ Is the patient on Low Flow Oxygen? Yes   Flow (L/min) 2   SpO2 98 %   Pulse Oximetry Type Intermittent   $ Pulse Oximetry - Multiple Charge Pulse Oximetry - Multiple   Pulse 66   Resp 16

## 2019-08-11 NOTE — PLAN OF CARE
"   08/11/19 1625   Discharge Reassessment   Assessment Type Discharge Planning Reassessment   THIS CM PRESENTED TO THE PATIENT'S ROOM TO DISCUSS THE MEDICARE OUTPATIENT OBSERVATION NOTICE.  AFTER SOME DISCUSSION AND EXPLANATION, THE PATIENT ACKNOWLEDGED UNDERSTANDING OF THE "MOON" FORM.  THE MOON WAS COMPLETED AND SIGNED.  "

## 2019-08-11 NOTE — ASSESSMENT & PLAN NOTE
· Much better today  · OK to DC home from pulmonary standpoint  · Should follow up with Dr Mendez in 1-2 weeks   Ensure Clear x 3 daily (720 kcal, 24 grams protein daily)/dysphagia 1, pureed, honey consistency fluid

## 2019-08-11 NOTE — HPI
85 yo male, pt of Dr Mendez, admitted with episode of epigastric chest discomfort and some increased SOB.  No fever, chills, sough, sputum, wheezing.  Feels much better today and is hoping to go home.  ROS as below.

## 2019-09-17 DIAGNOSIS — J41.1 CHRONIC BRONCHITIS, MUCOPURULENT: ICD-10-CM

## 2019-09-17 DIAGNOSIS — J47.9 BRONCHIECTASIS WITHOUT COMPLICATION: ICD-10-CM

## 2019-09-17 DIAGNOSIS — J44.1 COPD EXACERBATION: ICD-10-CM

## 2019-10-08 ENCOUNTER — LAB VISIT (OUTPATIENT)
Dept: LAB | Facility: HOSPITAL | Age: 84
End: 2019-10-08
Attending: INTERNAL MEDICINE
Payer: MEDICARE

## 2019-10-08 DIAGNOSIS — N18.9 CHRONIC KIDNEY DISEASE, UNSPECIFIED: Primary | ICD-10-CM

## 2019-10-08 DIAGNOSIS — I10 ESSENTIAL HYPERTENSION, MALIGNANT: ICD-10-CM

## 2019-10-08 DIAGNOSIS — I48.0 PAROXYSMAL ATRIAL FIBRILLATION: ICD-10-CM

## 2019-10-08 DIAGNOSIS — I25.10 CORONARY ATHEROSCLEROSIS OF NATIVE CORONARY ARTERY: ICD-10-CM

## 2019-10-08 LAB — DIGOXIN SERPL-MCNC: 0.6 NG/ML (ref 0.8–2)

## 2019-10-08 PROCEDURE — 36415 COLL VENOUS BLD VENIPUNCTURE: CPT

## 2019-10-08 PROCEDURE — 80162 ASSAY OF DIGOXIN TOTAL: CPT

## 2019-10-15 ENCOUNTER — OFFICE VISIT (OUTPATIENT)
Dept: PULMONOLOGY | Facility: CLINIC | Age: 84
End: 2019-10-15
Payer: MEDICARE

## 2019-10-15 VITALS
HEART RATE: 53 BPM | DIASTOLIC BLOOD PRESSURE: 77 MMHG | WEIGHT: 202.63 LBS | HEIGHT: 72 IN | OXYGEN SATURATION: 96 % | BODY MASS INDEX: 27.44 KG/M2 | SYSTOLIC BLOOD PRESSURE: 165 MMHG

## 2019-10-15 DIAGNOSIS — J47.9 BRONCHIECTASIS WITHOUT COMPLICATION: ICD-10-CM

## 2019-10-15 DIAGNOSIS — M54.42 ACUTE BILATERAL LOW BACK PAIN WITH LEFT-SIDED SCIATICA: ICD-10-CM

## 2019-10-15 DIAGNOSIS — J44.9 MIXED TYPE COPD (CHRONIC OBSTRUCTIVE PULMONARY DISEASE): ICD-10-CM

## 2019-10-15 DIAGNOSIS — J41.1 CHRONIC BRONCHITIS, MUCOPURULENT: Primary | ICD-10-CM

## 2019-10-15 DIAGNOSIS — J44.1 COPD EXACERBATION: ICD-10-CM

## 2019-10-15 PROCEDURE — 99213 PR OFFICE/OUTPT VISIT, EST, LEVL III, 20-29 MIN: ICD-10-PCS | Mod: S$PBB,,, | Performed by: INTERNAL MEDICINE

## 2019-10-15 PROCEDURE — 99999 PR PBB SHADOW E&M-EST. PATIENT-LVL IV: ICD-10-PCS | Mod: PBBFAC,,, | Performed by: INTERNAL MEDICINE

## 2019-10-15 PROCEDURE — 99214 OFFICE O/P EST MOD 30 MIN: CPT | Mod: PBBFAC,PO | Performed by: INTERNAL MEDICINE

## 2019-10-15 PROCEDURE — 99999 PR PBB SHADOW E&M-EST. PATIENT-LVL IV: CPT | Mod: PBBFAC,,, | Performed by: INTERNAL MEDICINE

## 2019-10-15 PROCEDURE — 99213 OFFICE O/P EST LOW 20 MIN: CPT | Mod: S$PBB,,, | Performed by: INTERNAL MEDICINE

## 2019-10-15 RX ORDER — LEVOFLOXACIN 500 MG/1
TABLET, FILM COATED ORAL
Refills: 2 | COMMUNITY
Start: 2019-08-13 | End: 2019-10-15 | Stop reason: SDUPTHER

## 2019-10-15 RX ORDER — LEVOFLOXACIN 500 MG/1
500 TABLET, FILM COATED ORAL DAILY
Qty: 10 TABLET | Refills: 2 | Status: SHIPPED | OUTPATIENT
Start: 2019-10-15 | End: 2020-02-19 | Stop reason: SDUPTHER

## 2019-10-15 RX ORDER — ALBUTEROL SULFATE 90 UG/1
1-2 AEROSOL, METERED RESPIRATORY (INHALATION) EVERY 4 HOURS PRN
Qty: 3 INHALER | Refills: 3 | Status: SHIPPED | OUTPATIENT
Start: 2019-10-15

## 2019-10-15 RX ORDER — TRIAMCINOLONE ACETONIDE 1 MG/G
CREAM TOPICAL
Refills: 3 | COMMUNITY
Start: 2019-09-23

## 2019-10-15 RX ORDER — PREDNISONE 20 MG/1
TABLET ORAL
Qty: 12 TABLET | Refills: 2 | Status: SHIPPED | OUTPATIENT
Start: 2019-10-15 | End: 2020-02-19 | Stop reason: SDUPTHER

## 2019-10-15 RX ORDER — HYDROCODONE BITARTRATE AND ACETAMINOPHEN 5; 325 MG/1; MG/1
1 TABLET ORAL EVERY 4 HOURS PRN
Qty: 28 TABLET | Refills: 0 | Status: SHIPPED | OUTPATIENT
Start: 2019-10-15 | End: 2020-02-19 | Stop reason: SDUPTHER

## 2019-10-15 NOTE — PATIENT INSTRUCTIONS
norco 28 for a wk for acute pain, if chronic may refil     Use prednisone for 1-3 days if any short breath    Use levaquin for yellow mucous.    Use trelegy daily.

## 2019-10-15 NOTE — PROGRESS NOTES
10/15/2019    [facility]  Office Note    Chief Complaint   Patient presents with    Follow-up     3 month    Bronchiectasis    COPD       HPI:   Oct 15, 2019-has acute low back pain needing norco 5 intermittently - 28 pills lasted from July.  Had sob with copd flare- ppt day in [facility]- saw Dr Rob.  Took prednisone. Cleared.  No lung mucous.      July 16, 2019- sciatica resolved, recommended medrol dose pack but clear.  Uses norco for cough, only got 65 pills in April with no refils.  Having am hoarseness with copious sinus drip.  Patient Instructions   Use hydrocodone cough meds  Use steroids if back or lungs flare  Suggest flonase for sinus drip.  April 15, 2019- [place] [place] [facility]    Patient Instructions   Sent in norco 5 - 65 , then 60 & 60-- for next 3 months  April 4, 2019- major complaint left knee pain, pain 8 on 0-10 scale, onset 6 weeks, no improvement, took Norco 5-325 with slight improvement able to walk. SOB with exertion, cough nonproductive, worse during day.   Appointment Dr. Alejandro orthopedic surgeon scheduled 4/9/19.   Addendum 2/28/2019- pt call stating back pain sciatic involvement not improved since last appointment. Requesting referral to orthopedics.  Patient Instructions   Sciatic nerve is acutely inflamed. Will order Norco 5 every 4 hours for 7 days, will get you to your appointment with Orthopedic surgeon  Continue current COPD therapy.  Jan 4, 2010 - had had up to 1/2 cup mucous/d.   H/o bronchiectiectasis. 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   Back pain limits activity. States no current cough. Using Trelegy daily. States improved breathing.  Pt states cough controlled with Norco only with Bronchitis exacerbation. 1-2 pills daily. Last used in week prior.  10/31/18 SOB-with exertion, worse at day, relieved by rest and Albuterol inhaler, albuterol 1x weekly, has had to stop fishing. Currently using treley  daily.   States Trelegy is the best inhaler he has had.    Patient Instructions   Stop Azithromycin  Use Levaquin 500 mg for 5 days for yellow green mucous  Can continue for up to 7 days if needed to clear secretions.  Continue Trelegy daily  Use Albuterol inhaler 2 puffs every 4 hours as needed for SOB and wheeze  2018- took abx but run out abx, uses norco for violent cough.  trelegy works great.  2018- having ivory mucous last 6 wks with violent cough, breathing ok, appetite ok  -  Feeling better, having dry nonproductive cough, no abx/sterois  2017, took tripak 3 days for 5 times and have had no problem.  Has been on digoxin for afib . Has had yellow mucous from chest with negative cultures.  Has spontneous pneumo 20 yrs ago, had tb at 40 yo.  Has had lung problems last 20 yrs with stagnant pattern. Mucous up to 1/2 cup a day.  2016HPI: now cleared lingering flare, cultures neg, hydrocodone- excellent cough suppression and has 12 left. No cough, or wheeze or sob.      The chief compliant  problem is worsening  PFSH:  Past Medical History:   Diagnosis Date    Arthritis     Finger Rt hand    Atrial fibrillation ,     Cardioversion X2  Dr. 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     LUNG SURGERY      PROSTATE SURGERY      Varicele       Social History     Tobacco Use    Smoking status: Former Smoker     Last attempt to quit: 1969     Years since quittin.8    Smokeless tobacco: Never Used    Substance Use Topics    Alcohol use: No    Drug use: No     Family History   Problem Relation Age of Onset    Kidney disease Father     Tuberculosis Father      Review of patient's allergies indicates:   Allergen Reactions    Hydrocodone Itching     I have reviewed past medical, family, and social history. I have reviewed previous nurse notes.    Performance Status:The patient's activity level is housebound activities.         Review of Systems   Constitutional: Negative for activity change, appetite change, chills, diaphoresis, fatigue, fever and unexpected weight change.   HENT: Negative for dental problem, postnasal drip, rhinorrhea, sinus pressure, sinus pain, sneezing, sore throat, trouble swallowing and voice change.    Respiratory: Negative for apnea, chest tightness, shortness of breath, wheezing and stridor.  Positive for cough  Cardiovascular: Negative for chest pain, palpitations and leg swelling.   Gastrointestinal: Negative for abdominal distention, abdominal pain, constipation and nausea.   Musculoskeletal: Negative for gait problem and neck pain.Positive for lower back and left knee pain, sharp   Skin: Negative for color change and pallor.   Allergic/Immunologic: Negative for environmental allergies and food allergies.   Neurological: Negative for dizziness, speech difficulty, weakness, light-headedness, numbness and headaches.   Hematological: Negative for adenopathy. Does not bruise/bleed easily.   Psychiatric/Behavioral: Negative for dysphoric mood and sleep disturbance. The patient is not nervous/anxious.           Exam:Comprehensive exam done. BP (!) 165/77 (BP Location: Right arm, Patient Position: Sitting)   Pulse (!) 53   Ht 6' (1.829 m)   Wt 91.9 kg (202 lb 9.6 oz)   SpO2 96% Comment: on room air  BMI 27.48 kg/m²   Exam included Vitals as listed  Constitutional: He is oriented to person, place, and time. He appears well-developed. No distress.   Nose: Nose normal.   Mouth/Throat:  Uvula is midline, oropharynx is clear and moist and mucous membranes are normal. No dental caries. No oropharyngeal exudate, posterior oropharyngeal edema, posterior oropharyngeal erythema or tonsillar abscesses.  M[place]atti (M) score 3  Eyes: Pupils are equal, round, and reactive to light.   Neck: No JVD present. No thyromegaly present.   Cardiovascular: Normal rate, regular rhythm and normal heart sounds. Exam reveals no gallop and no friction rub.   No murmur heard.  Pulmonary/Chest: Effort normal and breath sounds normal. No accessory muscle usage or stridor. No apnea and no tachypnea. No respiratory distress, decreased breath sounds, wheezes, rhonchi, rales, or tenderness.   Abdominal: Soft. He exhibits no mass. There is no tenderness. No hepatosplenomegaly, hernias and normoactive bowel sounds  Musculoskeletal: Normal range of motion. exhibits no edema.   Lymphadenopathy:     He has no cervical adenopathy.     He has no axillary adenopathy.   Neurological:  alert and oriented to person, place, and time. not disoriented.   Skin: Skin is warm and dry.No cyanosis or erythema. No pallor. Nails show no clubbing.   Psychiatric: normal mood and affect. behavior is normal. Judgment and thought content normal.       Radiographs (ct chest and cxr) reviewed: results reviewed   EXAMINATION:  XR CHEST PA AND LATERAL    CLINICAL HISTORY:  Bronchiectasis, uncomplicated    TECHNIQUE:  PA and lateral views of the chest were performed.    COMPARISON:  9/21/2016    FINDINGS:  The cardiomediastinal silhouette is stable.  There is hyperinflation of the lungs.  There is mild elevation of left hemidiaphragm relative to the right.  There is bilateral apical pleural and parenchymal scarring more so on the left and mild cephalad retraction of the left hilum and there is mild chronic interstitial prominence particularly left lung base.  There are postoperative changes left hemithorax.  There is no new or confluent infiltrate or pleural  effusion.      Impression       No acute cardiopulmonary disease or significant oval change compared to the prior exam.      Electronically signed by: Janette Bryson MD  Date: 07/10/2018  Time: 10:12         Labs reviewed    [facility] follows labwork: reviewed    PFT ordered and will be reviewed      Plan:  Clinical impression is apparently straight forward and impression with management as below.    Vazquez was seen today for follow-up, bronchiectasis and copd.    Diagnoses and all orders for this visit:    Chronic bronchitis, mucopurulent  -     levoFLOXacin (LEVAQUIN) 500 MG tablet; Take 1 tablet (500 mg total) by mouth once daily.  -     predniSONE (DELTASONE) 20 MG tablet; One daily for 3 days and repeat for flare of lung symptoms as intructed  -     PROAIR HFA 90 mcg/actuation inhaler; Inhale 1-2 puffs into the lungs every 4 (four) hours as needed for Wheezing.    Acute bilateral low back pain with left-sided sciatica  -     HYDROcodone-acetaminophen (NORCO) 5-325 mg per tablet; Take 1 tablet by mouth every 4 (four) hours as needed for Pain.    Bronchiectasis without complication  -     levoFLOXacin (LEVAQUIN) 500 MG tablet; Take 1 tablet (500 mg total) by mouth once daily.  -     predniSONE (DELTASONE) 20 MG tablet; One daily for 3 days and repeat for flare of lung symptoms as intructed  -     PROAIR HFA 90 mcg/actuation inhaler; Inhale 1-2 puffs into the lungs every 4 (four) hours as needed for Wheezing.    Mixed type COPD (chronic obstructive pulmonary disease)  -     levoFLOXacin (LEVAQUIN) 500 MG tablet; Take 1 tablet (500 mg total) by mouth once daily.  -     predniSONE (DELTASONE) 20 MG tablet; One daily for 3 days and repeat for flare of lung symptoms as intructed  -     PROAIR HFA 90 mcg/actuation inhaler; Inhale 1-2 puffs into the lungs every 4 (four) hours as needed for Wheezing.    COPD exacerbation  -     PROAIR HFA 90 mcg/actuation inhaler; Inhale 1-2 puffs into the lungs every 4 (four) hours as needed  for Wheezing.        Follow up in about 3 months (around 1/15/2020), or if symptoms worsen or fail to improve.    Discussed with patient above for education the following:      Patient Instructions   norco 28 for a wk for acute pain, if chronic may refil     Use prednisone for 1-3 days if any short breath    Use levaquin for yellow mucous.    Use trelegy daily.

## 2020-01-13 ENCOUNTER — LAB VISIT (OUTPATIENT)
Dept: LAB | Facility: HOSPITAL | Age: 85
End: 2020-01-13
Attending: INTERNAL MEDICINE
Payer: MEDICARE

## 2020-01-13 DIAGNOSIS — E78.00 PURE HYPERCHOLESTEROLEMIA: ICD-10-CM

## 2020-01-13 DIAGNOSIS — E11.40 DIABETIC NEUROPATHY: Primary | ICD-10-CM

## 2020-01-13 DIAGNOSIS — Z83.3 FAMILY HISTORY OF DIABETES MELLITUS: ICD-10-CM

## 2020-01-13 DIAGNOSIS — Z79.899 ENCOUNTER FOR LONG-TERM (CURRENT) USE OF OTHER MEDICATIONS: ICD-10-CM

## 2020-01-13 LAB
ANION GAP SERPL CALC-SCNC: 9 MMOL/L (ref 8–16)
BUN SERPL-MCNC: 19 MG/DL (ref 8–23)
CALCIUM SERPL-MCNC: 9.4 MG/DL (ref 8.7–10.5)
CHLORIDE SERPL-SCNC: 104 MMOL/L (ref 95–110)
CHOLEST SERPL-MCNC: 121 MG/DL (ref 120–199)
CHOLEST/HDLC SERPL: 4.3 {RATIO} (ref 2–5)
CO2 SERPL-SCNC: 27 MMOL/L (ref 23–29)
CREAT SERPL-MCNC: 1.7 MG/DL (ref 0.5–1.4)
EST. GFR  (AFRICAN AMERICAN): 41 ML/MIN/1.73 M^2
EST. GFR  (NON AFRICAN AMERICAN): 35.5 ML/MIN/1.73 M^2
ESTIMATED AVG GLUCOSE: 140 MG/DL (ref 68–131)
GLUCOSE SERPL-MCNC: 133 MG/DL (ref 70–110)
HBA1C MFR BLD HPLC: 6.5 % (ref 4.5–6.2)
HDLC SERPL-MCNC: 28 MG/DL (ref 40–75)
HDLC SERPL: 23.1 % (ref 20–50)
LDLC SERPL CALC-MCNC: 62 MG/DL (ref 63–159)
NONHDLC SERPL-MCNC: 93 MG/DL
POTASSIUM SERPL-SCNC: 4.7 MMOL/L (ref 3.5–5.1)
SODIUM SERPL-SCNC: 140 MMOL/L (ref 136–145)
TRIGL SERPL-MCNC: 155 MG/DL (ref 30–150)

## 2020-01-13 PROCEDURE — 83036 HEMOGLOBIN GLYCOSYLATED A1C: CPT

## 2020-01-13 PROCEDURE — 80061 LIPID PANEL: CPT

## 2020-01-13 PROCEDURE — 80048 BASIC METABOLIC PNL TOTAL CA: CPT

## 2020-01-13 PROCEDURE — 36415 COLL VENOUS BLD VENIPUNCTURE: CPT

## 2020-02-04 ENCOUNTER — LAB VISIT (OUTPATIENT)
Dept: LAB | Facility: HOSPITAL | Age: 85
End: 2020-02-04
Attending: INTERNAL MEDICINE
Payer: MEDICARE

## 2020-02-04 DIAGNOSIS — N25.81 SECONDARY HYPERPARATHYROIDISM OF RENAL ORIGIN: ICD-10-CM

## 2020-02-04 DIAGNOSIS — I48.91 ATRIAL FIBRILLATION: ICD-10-CM

## 2020-02-04 DIAGNOSIS — N18.30 CHRONIC KIDNEY DISEASE, STAGE III (MODERATE): Primary | ICD-10-CM

## 2020-02-04 DIAGNOSIS — R80.9 PROTEINURIA: ICD-10-CM

## 2020-02-04 DIAGNOSIS — R31.9 HEMATURIA SYNDROME: ICD-10-CM

## 2020-02-04 DIAGNOSIS — E11.9 DIABETES MELLITUS WITHOUT COMPLICATION: ICD-10-CM

## 2020-02-04 DIAGNOSIS — E79.0 URICACIDEMIA: ICD-10-CM

## 2020-02-04 DIAGNOSIS — I10 ESSENTIAL HYPERTENSION, MALIGNANT: ICD-10-CM

## 2020-02-04 LAB
ALBUMIN SERPL BCP-MCNC: 3.9 G/DL (ref 3.5–5.2)
ANION GAP SERPL CALC-SCNC: 11 MMOL/L (ref 8–16)
BILIRUB UR QL STRIP: NEGATIVE
BUN SERPL-MCNC: 27 MG/DL (ref 8–23)
CALCIUM SERPL-MCNC: 9.8 MG/DL (ref 8.7–10.5)
CHLORIDE SERPL-SCNC: 101 MMOL/L (ref 95–110)
CLARITY UR: CLEAR
CO2 SERPL-SCNC: 26 MMOL/L (ref 23–29)
COLOR UR: YELLOW
CREAT SERPL-MCNC: 1.8 MG/DL (ref 0.5–1.4)
CREAT UR-MCNC: 101 MG/DL (ref 23–375)
EST. GFR  (AFRICAN AMERICAN): 38.3 ML/MIN/1.73 M^2
EST. GFR  (NON AFRICAN AMERICAN): 33.1 ML/MIN/1.73 M^2
GLUCOSE SERPL-MCNC: 137 MG/DL (ref 70–110)
GLUCOSE UR QL STRIP: NEGATIVE
HGB UR QL STRIP: NEGATIVE
KETONES UR QL STRIP: NEGATIVE
LEUKOCYTE ESTERASE UR QL STRIP: NEGATIVE
NITRITE UR QL STRIP: NEGATIVE
PH UR STRIP: 6 [PH] (ref 5–8)
PHOSPHATE SERPL-MCNC: 4.1 MG/DL (ref 2.7–4.5)
POTASSIUM SERPL-SCNC: 4.6 MMOL/L (ref 3.5–5.1)
PROT UR QL STRIP: ABNORMAL
PROT UR-MCNC: 23 MG/DL (ref 0–15)
PROT/CREAT UR: 0.23 MG/G{CREAT} (ref 0–0.2)
SODIUM SERPL-SCNC: 138 MMOL/L (ref 136–145)
SP GR UR STRIP: 1.01 (ref 1–1.03)
URATE SERPL-MCNC: 8.5 MG/DL (ref 3.4–7)
URN SPEC COLLECT METH UR: ABNORMAL
UROBILINOGEN UR STRIP-ACNC: NEGATIVE EU/DL

## 2020-02-04 PROCEDURE — 81003 URINALYSIS AUTO W/O SCOPE: CPT

## 2020-02-04 PROCEDURE — 84156 ASSAY OF PROTEIN URINE: CPT

## 2020-02-04 PROCEDURE — 84550 ASSAY OF BLOOD/URIC ACID: CPT

## 2020-02-04 PROCEDURE — 36415 COLL VENOUS BLD VENIPUNCTURE: CPT

## 2020-02-04 PROCEDURE — 80069 RENAL FUNCTION PANEL: CPT

## 2020-02-19 ENCOUNTER — OFFICE VISIT (OUTPATIENT)
Dept: PULMONOLOGY | Facility: CLINIC | Age: 85
End: 2020-02-19
Payer: MEDICARE

## 2020-02-19 VITALS
DIASTOLIC BLOOD PRESSURE: 85 MMHG | OXYGEN SATURATION: 96 % | HEART RATE: 62 BPM | HEIGHT: 72 IN | WEIGHT: 203.13 LBS | SYSTOLIC BLOOD PRESSURE: 181 MMHG | RESPIRATION RATE: 18 BRPM | BODY MASS INDEX: 27.51 KG/M2

## 2020-02-19 DIAGNOSIS — M54.42 ACUTE BILATERAL LOW BACK PAIN WITH LEFT-SIDED SCIATICA: Primary | ICD-10-CM

## 2020-02-19 DIAGNOSIS — J41.1 CHRONIC BRONCHITIS, MUCOPURULENT: ICD-10-CM

## 2020-02-19 DIAGNOSIS — J44.9 MIXED TYPE COPD (CHRONIC OBSTRUCTIVE PULMONARY DISEASE): ICD-10-CM

## 2020-02-19 DIAGNOSIS — J47.9 BRONCHIECTASIS WITHOUT COMPLICATION: ICD-10-CM

## 2020-02-19 PROCEDURE — 99213 OFFICE O/P EST LOW 20 MIN: CPT | Mod: S$PBB,,, | Performed by: INTERNAL MEDICINE

## 2020-02-19 PROCEDURE — 99213 OFFICE O/P EST LOW 20 MIN: CPT | Mod: PBBFAC,PO | Performed by: INTERNAL MEDICINE

## 2020-02-19 PROCEDURE — 99213 PR OFFICE/OUTPT VISIT, EST, LEVL III, 20-29 MIN: ICD-10-PCS | Mod: S$PBB,,, | Performed by: INTERNAL MEDICINE

## 2020-02-19 PROCEDURE — 99999 PR PBB SHADOW E&M-EST. PATIENT-LVL III: ICD-10-PCS | Mod: PBBFAC,,, | Performed by: INTERNAL MEDICINE

## 2020-02-19 PROCEDURE — 99999 PR PBB SHADOW E&M-EST. PATIENT-LVL III: CPT | Mod: PBBFAC,,, | Performed by: INTERNAL MEDICINE

## 2020-02-19 RX ORDER — HYDROCODONE BITARTRATE AND ACETAMINOPHEN 5; 325 MG/1; MG/1
1 TABLET ORAL EVERY 4 HOURS PRN
Qty: 28 TABLET | Refills: 0 | Status: SHIPPED | OUTPATIENT
Start: 2020-02-19 | End: 2020-05-19 | Stop reason: SDUPTHER

## 2020-02-19 RX ORDER — PREDNISONE 20 MG/1
TABLET ORAL
Qty: 21 TABLET | Refills: 2 | Status: SHIPPED | OUTPATIENT
Start: 2020-02-19 | End: 2021-10-04 | Stop reason: SDUPTHER

## 2020-02-19 RX ORDER — LEVOFLOXACIN 500 MG/1
500 TABLET, FILM COATED ORAL DAILY
Qty: 10 TABLET | Refills: 4 | Status: SHIPPED | OUTPATIENT
Start: 2020-02-19 | End: 2021-03-01

## 2020-02-19 NOTE — PROGRESS NOTES
2/19/2020 2/19/2020    Vazquez Vegas  Office Note    Chief Complaint   Patient presents with    Follow-up     3 month       HPI:   2/19/2019-needed narco for back and cough problems but ran out.  Had bad cough and out of levaquin.    Oct 15, 2019-has acute low back pain needing norco 5 intermittently - 28 pills lasted from July.  Had sob with copd flare- ppt day in [facility]- saw Dr Rob.  Took prednisone. Cleared.  No lung mucous.  Patient Instructions   norco 28 for a wk for acute pain, if chronic may refil   Use prednisone for 1-3 days if any short breath  Use levaquin for yellow mucous.  Use trelegy daily.  July 16, 2019- sciatica resolved, recommended medrol dose pack but clear.  Uses norco for cough, only got 65 pills in April with no refils.  Having am hoarseness with copious sinus drip.  Patient Instructions   Use hydrocodone cough meds  Use steroids if back or lungs flare  Suggest flonase for sinus drip.  April 15, 2019- [facility] [place] [facility]    Patient Instructions   Sent in norco 5 - 65 , then 60 & 60-- for next 3 months  April 4, 2019- major complaint left knee pain, pain 8 on 0-10 scale, onset 6 weeks, no improvement, took Norco 5-325 with slight improvement able to walk. SOB with exertion, cough nonproductive, worse during day.   Appointment Dr. Alejandro orthopedic surgeon scheduled 4/9/19.   Addendum 2/28/2019- pt call stating back pain sciatic involvement not improved since last appointment. Requesting referral to orthopedics.  Patient Instructions   Sciatic nerve is acutely inflamed. Will order Norco 5 every 4 hours for 7 days, will get you to your appointment with Orthopedic surgeon  Continue current COPD therapy.  Jan 4, 2010 - had had up to 1/2 cup mucous/d.   H/o bronchiectiectasis. 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   Back pain limits activity. States no current cough. Using Trelegy daily. States improved breathing.  Pt  states cough controlled with Norco only with Bronchitis exacerbation. 1-2 pills daily. Last used in week prior.  10/31/18 SOB-with exertion, worse at day, relieved by rest and Albuterol inhaler, albuterol 1x weekly, has had to stop fishing. Currently using treley daily.   States Trelegy is the best inhaler he has had.    Patient Instructions   Stop Azithromycin  Use Levaquin 500 mg for 5 days for yellow green mucous  Can continue for up to 7 days if needed to clear secretions.  Continue Trelegy daily  Use Albuterol inhaler 2 puffs every 4 hours as needed for SOB and wheeze  July 23, 2018- took abx but run out abx, uses norco for violent cough.  trelegy works great.  Jan 25, 2018- having ivory mucous last 6 wks with violent cough, breathing ok, appetite ok  July 25,2017-  Feeling better, having dry nonproductive cough, no abx/sterois  April 20, 2017, took tripak 3 days for 5 times and have had no problem.  Has been on digoxin for afib . Has had yellow mucous from chest with negative cultures.  Has spontneous pneumo 20 yrs ago, had tb at 38 yo.  Has had lung problems last 20 yrs with stagnant pattern. Mucous up to 1/2 cup a day.  Nov 22, 2016HPI: now cleared lingering flare, cultures neg, hydrocodone- excellent cough suppression and has 12 left. No cough, or wheeze or sob.      The chief compliant  problem is worsening  PFSH:  Past Medical History:   Diagnosis Date    Arthritis     Finger Rt hand    Atrial fibrillation 2012, 2013    Cardioversion X2  Dr. Cortes, Cardiologist    BPH (benign prostatic hyperplasia)     COPD (chronic obstructive pulmonary disease)     Diabetes mellitus     Hypertension     Kidney mass     Migraine headache     Pneumothorax     25 years ago    Renal disorder     ONLY HAS RT KIDNEY    TB (pulmonary tuberculosis)          Past Surgical History:   Procedure Laterality Date    colonscopy      EYE SURGERY  2013    Bilateral cataracs      head surgery      growth removed     KIDNEY SURGERY      L kidney removed.     LUNG SURGERY      PROSTATE SURGERY      Varicele       Social History     Tobacco Use    Smoking status: Former Smoker     Last attempt to quit: 1969     Years since quittin.1    Smokeless tobacco: Never Used   Substance Use Topics    Alcohol use: No    Drug use: No     Family History   Problem Relation Age of Onset    Kidney disease Father     Tuberculosis Father      Review of patient's allergies indicates:   Allergen Reactions    Hydrocodone Itching     I have reviewed past medical, family, and social history. I have reviewed previous nurse notes.    Performance Status:The patient's activity level is housebound activities.         Review of Systems   Constitutional: Negative for activity change, appetite change, chills, diaphoresis, fatigue, fever and unexpected weight change.   HENT: Negative for dental problem, postnasal drip, rhinorrhea, sinus pressure, sinus pain, sneezing, sore throat, trouble swallowing and voice change.    Respiratory: Negative for apnea, chest tightness, shortness of breath, wheezing and stridor.  Positive for cough  Cardiovascular: Negative for chest pain, palpitations and leg swelling.   Gastrointestinal: Negative for abdominal distention, abdominal pain, constipation and nausea.   Musculoskeletal: Negative for gait problem and neck pain.Positive for lower back and left knee pain, sharp   Skin: Negative for color change and pallor.   Allergic/Immunologic: Negative for environmental allergies and food allergies.   Neurological: Negative for dizziness, speech difficulty, weakness, light-headedness, numbness and headaches.   Hematological: Negative for adenopathy. Does not bruise/bleed easily.   Psychiatric/Behavioral: Negative for dysphoric mood and sleep disturbance. The patient is not nervous/anxious.           Exam:Comprehensive exam done. BP (!) 181/85 (BP Location: Right arm, Patient Position: Sitting)   Pulse 62   Resp  18   Ht 6' (1.829 m)   Wt 92.1 kg (203 lb 2.5 oz)   SpO2 96% Comment: room air  BMI 27.55 kg/m²   Exam included Vitals as listed  Constitutional: He is oriented to person, place, and time. He appears well-developed. No distress.   Nose: Nose normal.   Mouth/Throat: Uvula is midline, oropharynx is clear and moist and mucous membranes are normal. No dental caries. No oropharyngeal exudate, posterior oropharyngeal edema, posterior oropharyngeal erythema or tonsillar abscesses.  Mallapatti (M) score 3  Eyes: Pupils are equal, round, and reactive to light.   Neck: No JVD present. No thyromegaly present.   Cardiovascular: Normal rate, regular rhythm and normal heart sounds. Exam reveals no gallop and no friction rub.   No murmur heard.  Pulmonary/Chest: Effort normal and breath sounds normal. No accessory muscle usage or stridor. No apnea and no tachypnea. No respiratory distress, decreased breath sounds, wheezes, rhonchi, rales, or tenderness.   Abdominal: Soft. He exhibits no mass. There is no tenderness. No hepatosplenomegaly, hernias and normoactive bowel sounds  Musculoskeletal: Normal range of motion. exhibits no edema.   Lymphadenopathy:     He has no cervical adenopathy.     He has no axillary adenopathy.   Neurological:  alert and oriented to person, place, and time. not disoriented.   Skin: Skin is warm and dry.No cyanosis or erythema. No pallor. Nails show no clubbing.   Psychiatric: normal mood and affect. behavior is normal. Judgment and thought content normal.       Radiographs (ct chest and cxr) reviewed: results reviewed   EXAMINATION:  XR CHEST PA AND LATERAL    CLINICAL HISTORY:  Bronchiectasis, uncomplicated    TECHNIQUE:  PA and lateral views of the chest were performed.    COMPARISON:  9/21/2016    FINDINGS:  The cardiomediastinal silhouette is stable.  There is hyperinflation of the lungs.  There is mild elevation of left hemidiaphragm relative to the right.  There is bilateral apical pleural and  parenchymal scarring more so on the left and mild cephalad retraction of the left hilum and there is mild chronic interstitial prominence particularly left lung base.  There are postoperative changes left hemithorax.  There is no new or confluent infiltrate or pleural effusion.      Impression       No acute cardiopulmonary disease or significant oval change compared to the prior exam.      Electronically signed by: Janette Bryson MD  Date: 07/10/2018  Time: 10:12         Labs reviewed    [facility] follows labwork: reviewed    PFT ordered and will be reviewed      Plan:  Clinical impression is apparently straight forward and impression with management as below.    Vazquez was seen today for follow-up.    Diagnoses and all orders for this visit:    Acute bilateral low back pain with left-sided sciatica  -     HYDROcodone-acetaminophen (NORCO) 5-325 mg per tablet; Take 1 tablet by mouth every 4 (four) hours as needed for Pain.    Chronic bronchitis, mucopurulent  -     levoFLOXacin (LEVAQUIN) 500 MG tablet; Take 1 tablet (500 mg total) by mouth once daily.  -     predniSONE (DELTASONE) 20 MG tablet; One daily for 3 days and repeat for flare of lung symptoms as intructed    Bronchiectasis without complication  -     levoFLOXacin (LEVAQUIN) 500 MG tablet; Take 1 tablet (500 mg total) by mouth once daily.  -     predniSONE (DELTASONE) 20 MG tablet; One daily for 3 days and repeat for flare of lung symptoms as intructed    Mixed type COPD (chronic obstructive pulmonary disease)  -     levoFLOXacin (LEVAQUIN) 500 MG tablet; Take 1 tablet (500 mg total) by mouth once daily.  -     predniSONE (DELTASONE) 20 MG tablet; One daily for 3 days and repeat for flare of lung symptoms as intructed        Follow up in about 3 months (around 5/19/2020), or if symptoms worsen or fail to improve.    Discussed with patient above for education the following:      Patient Instructions   norco - wk april, may need on regular basis.             Vazquez Vegas  Office Note    Chief Complaint   Patient presents with    Follow-up     3 month       HPI:     2/19/2020-    Oct 15, 2019-has acute low back pain needing norco 5 intermittently - 28 pills lasted from July.  Had sob with copd flare- ppt day in [facility]- saw Dr Rob.  Took prednisone. Cleared.  No lung mucous.      July 16, 2019- sciatica resolved, recommended medrol dose pack but clear.  Uses norco for cough, only got 65 pills in April with no refils.  Having am hoarseness with copious sinus drip.  Patient Instructions   Use hydrocodone cough meds  Use steroids if back or lungs flare  Suggest flonase for sinus drip.  April 15, 2019- [facility] [place] [facility]    Patient Instructions   Sent in norco 5 - 65 , then 60 & 60-- for next 3 months  April 4, 2019- major complaint left knee pain, pain 8 on 0-10 scale, onset 6 weeks, no improvement, took Norco 5-325 with slight improvement able to walk. SOB with exertion, cough nonproductive, worse during day.   Appointment Dr. Alejandro orthopedic surgeon scheduled 4/9/19.   Addendum 2/28/2019- pt call stating back pain sciatic involvement not improved since last appointment. Requesting referral to orthopedics.  Patient Instructions   Sciatic nerve is acutely inflamed. Will order Norco 5 every 4 hours for 7 days, will get you to your appointment with Orthopedic surgeon  Continue current COPD therapy.  Jan 4, 2010 - had had up to 1/2 cup mucous/d.   H/o bronchiectiectasis. 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   Back pain limits activity. States no current cough. Using Trelegy daily. States improved breathing.  Pt states cough controlled with Norco only with Bronchitis exacerbation. 1-2 pills daily. Last used in week prior.  10/31/18 SOB-with exertion, worse at day, relieved by rest and Albuterol inhaler, albuterol 1x weekly, has had to stop fishing. Currently using treley daily.   States  Trelegy is the best inhaler he has had.    Patient Instructions   Stop Azithromycin  Use Levaquin 500 mg for 5 days for yellow green mucous  Can continue for up to 7 days if needed to clear secretions.  Continue Trelegy daily  Use Albuterol inhaler 2 puffs every 4 hours as needed for SOB and wheeze  2018- took abx but run out abx, uses norco for violent cough.  trelegy works great.  2018- having ivory mucous last 6 wks with violent cough, breathing ok, appetite ok  -  Feeling better, having dry nonproductive cough, no abx/sterois  2017, took tripak 3 days for 5 times and have had no problem.  Has been on digoxin for afib . Has had yellow mucous from chest with negative cultures.  Has spontneous pneumo 20 yrs ago, had tb at 40 yo.  Has had lung problems last 20 yrs with stagnant pattern. Mucous up to 1/2 cup a day.  2016HPI: now cleared lingering flare, cultures neg, hydrocodone- excellent cough suppression and has 12 left. No cough, or wheeze or sob.      The chief compliant  problem is worsening  PFSH:  Past Medical History:   Diagnosis Date    Arthritis     Finger Rt hand    Atrial fibrillation ,     Cardioversion X2  Dr. 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     LUNG SURGERY      PROSTATE SURGERY      Varicele       Social History     Tobacco Use    Smoking status: Former Smoker     Last attempt to quit: 1969     Years since quittin.1    Smokeless tobacco: Never Used   Substance Use  Topics    Alcohol use: No    Drug use: No     Family History   Problem Relation Age of Onset    Kidney disease Father     Tuberculosis Father      Review of patient's allergies indicates:   Allergen Reactions    Hydrocodone Itching     I have reviewed past medical, family, and social history. I have reviewed previous nurse notes.    Performance Status:The patient's activity level is housebound activities.        Review of Systems:  a review of eleven systems covering constitutional, Eye, HEENT, Psych, Respiratory, Cardiac, GI, , Musculoskeletal, Endocrine, Dermatologic was negative except for pertinent findings as listed ABOVE and below:  pertinent positive as above, rest is good       Exam:Comprehensive exam done. BP (!) 181/85 (BP Location: Right arm, Patient Position: Sitting)   Pulse 62   Resp 18   Ht 6' (1.829 m)   Wt 92.1 kg (203 lb 2.5 oz)   SpO2 96% Comment: room air  BMI 27.55 kg/m²   Exam included Vitals as listed, and patient's appearance and affect and alertness and mood, oral exam for yeast and hygiene and pharynx lesions and Mallapatti (M) score, neck with inspection for jvd and masses and thyroid abnormalities and lymph nodes (supraclavicular and infraclavicular nodes and axillary also examined and noted if abn), chest exam included symmetry and effort and fremitus and percussion and auscultation, cardiac exam included rhythm and gallops and murmur and rubs and jvd and edema, abdominal exam for mass and hepatosplenomegaly and tenderness and hernias and bowel sounds, Musculoskeletal exam with muscle tone and posture and mobility/gait and  strength, and skin for rashes and cyanosis and pallor and turgor, extremity for clubbing.  Findings were normal except for pertinent findings listed below:  M1, chest is symmetric, no distress, normal percussion, normal fremitus and good normal breath sounds  Trace edema.      Radiographs (ct chest and cxr) reviewed: results reviewed     8/10/19 cxr  stable from 7/2018and 9/21/16  Ct chest 2015 viewed also 2/19/2020      EXAMINATION:  XR CHEST PA AND LATERAL    CLINICAL HISTORY:  Bronchiectasis, uncomplicated    TECHNIQUE:  PA and lateral views of the chest were performed.    COMPARISON:  9/21/2016    FINDINGS:  The cardiomediastinal silhouette is stable.  There is hyperinflation of the lungs.  There is mild elevation of left hemidiaphragm relative to the right.  There is bilateral apical pleural and parenchymal scarring more so on the left and mild cephalad retraction of the left hilum and there is mild chronic interstitial prominence particularly left lung base.  There are postoperative changes left hemithorax.  There is no new or confluent infiltrate or pleural effusion.      Impression       No acute cardiopulmonary disease or significant oval change compared to the prior exam.      Electronically signed by: Janette Bryson MD  Date: 07/10/2018  Time: 10:12         Labs reviewed    [facility] follows labwork: reviewed    PFT ordered and will be reviewed      Plan:  Clinical impression is apparently straight forward and impression with management as below.    Vazquez was seen today for follow-up.    Diagnoses and all orders for this visit:    Acute bilateral low back pain with left-sided sciatica  -     HYDROcodone-acetaminophen (NORCO) 5-325 mg per tablet; Take 1 tablet by mouth every 4 (four) hours as needed for Pain.    Chronic bronchitis, mucopurulent  -     levoFLOXacin (LEVAQUIN) 500 MG tablet; Take 1 tablet (500 mg total) by mouth once daily.  -     predniSONE (DELTASONE) 20 MG tablet; One daily for 3 days and repeat for flare of lung symptoms as intructed    Bronchiectasis without complication  -     levoFLOXacin (LEVAQUIN) 500 MG tablet; Take 1 tablet (500 mg total) by mouth once daily.  -     predniSONE (DELTASONE) 20 MG tablet; One daily for 3 days and repeat for flare of lung symptoms as intructed    Mixed type COPD (chronic obstructive pulmonary  disease)  -     levoFLOXacin (LEVAQUIN) 500 MG tablet; Take 1 tablet (500 mg total) by mouth once daily.  -     predniSONE (DELTASONE) 20 MG tablet; One daily for 3 days and repeat for flare of lung symptoms as intructed        Follow up in about 3 months (around 5/19/2020), or if symptoms worsen or fail to improve.    Discussed with patient above for education the following:      Patient Instructions   norco - wk worth, may need on regular basis.

## 2020-05-19 ENCOUNTER — OFFICE VISIT (OUTPATIENT)
Dept: PULMONOLOGY | Facility: CLINIC | Age: 85
End: 2020-05-19
Payer: MEDICARE

## 2020-05-19 VITALS
HEART RATE: 54 BPM | WEIGHT: 203.69 LBS | OXYGEN SATURATION: 97 % | BODY MASS INDEX: 27.63 KG/M2 | SYSTOLIC BLOOD PRESSURE: 176 MMHG | DIASTOLIC BLOOD PRESSURE: 84 MMHG

## 2020-05-19 DIAGNOSIS — M54.42 ACUTE BILATERAL LOW BACK PAIN WITH LEFT-SIDED SCIATICA: ICD-10-CM

## 2020-05-19 PROCEDURE — 99213 PR OFFICE/OUTPT VISIT, EST, LEVL III, 20-29 MIN: ICD-10-PCS | Mod: S$PBB,,, | Performed by: INTERNAL MEDICINE

## 2020-05-19 PROCEDURE — 99213 OFFICE O/P EST LOW 20 MIN: CPT | Mod: S$PBB,,, | Performed by: INTERNAL MEDICINE

## 2020-05-19 PROCEDURE — 99999 PR PBB SHADOW E&M-EST. PATIENT-LVL IV: ICD-10-PCS | Mod: PBBFAC,,, | Performed by: INTERNAL MEDICINE

## 2020-05-19 PROCEDURE — 99999 PR PBB SHADOW E&M-EST. PATIENT-LVL IV: CPT | Mod: PBBFAC,,, | Performed by: INTERNAL MEDICINE

## 2020-05-19 PROCEDURE — 99214 OFFICE O/P EST MOD 30 MIN: CPT | Mod: PBBFAC,PO | Performed by: INTERNAL MEDICINE

## 2020-05-19 RX ORDER — HYDROCODONE BITARTRATE AND ACETAMINOPHEN 5; 325 MG/1; MG/1
1 TABLET ORAL EVERY 4 HOURS PRN
Qty: 28 TABLET | Refills: 0 | Status: SHIPPED | OUTPATIENT
Start: 2020-05-19 | End: 2020-07-20 | Stop reason: SDUPTHER

## 2020-05-19 RX ORDER — APIXABAN 2.5 MG/1
TABLET, FILM COATED ORAL
COMMUNITY
Start: 2020-04-02

## 2020-05-19 NOTE — PROGRESS NOTES
5/19/2020 5/19/2020    [facility]  Office Note    Chief Complaint   Patient presents with    Follow-up     bronchietas getting worse       HPI:   5/19/2020 - having cough and back pain- need narcos but ran out.  Has prednisone at home, used one to 2 . Uses albuterol as needed.  trelegy daily.  No mucous.  Uses back  Brace when out.    2/19/2019-needed narco for back and cough problems but ran out.  Had bad cough and out of levaquin.    Patient Instructions   norco - wk worth, may need on regular basis.    Oct 15, 2019-has acute low back pain needing norco 5 intermittently - 28 pills lasted from July.  Had sob with copd flare- ppt day in [facility]- saw Dr Rob.  Took prednisone. Cleared.  No lung mucous.  Patient Instructions   norco 28 for a wk for acute pain, if chronic may refil   Use prednisone for 1-3 days if any short breath  Use levaquin for yellow mucous.  Use trelegy daily.  July 16, 2019- sciatica resolved, recommended medrol dose pack but clear.  Uses norco for cough, only got 65 pills in April with no refils.  Having am hoarseness with copious sinus drip.  Patient Instructions   Use hydrocodone cough meds  Use steroids if back or lungs flare  Suggest flonase for sinus drip.  April 15, 2019- seeing Dr Alejandro In [place] - [facility].    Patient Instructions   Sent in norco 5 - 65 , then 60 & 60-- for next 3 months  April 4, 2019- major complaint left knee pain, pain 8 on 0-10 scale, onset 6 weeks, no improvement, took Norco 5-325 with slight improvement able to walk. SOB with exertion, cough nonproductive, worse during day.   Appointment Dr. Alejandro orthopedic surgeon scheduled 4/9/19.   Addendum 2/28/2019- pt call stating back pain sciatic involvement not improved since last appointment. Requesting referral to orthopedics.  Patient Instructions   Sciatic nerve is acutely inflamed. Will order Norco 5 every 4 hours for 7 days, will get you to your appointment with Orthopedic surgeon  Continue  current COPD therapy.  Jan 4, 2010 - had had up to 1/2 cup mucous/d.   H/o bronchiectiectasis. 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   Back pain limits activity. States no current cough. Using Trelegy daily. States improved breathing.  Pt states cough controlled with Norco only with Bronchitis exacerbation. 1-2 pills daily. Last used in week prior.  10/31/18 SOB-with exertion, worse at day, relieved by rest and Albuterol inhaler, albuterol 1x weekly, has had to stop fishing. Currently using treley daily.   States Trelegy is the best inhaler he has had.    Patient Instructions   Stop Azithromycin  Use Levaquin 500 mg for 5 days for yellow green mucous  Can continue for up to 7 days if needed to clear secretions.  Continue Trelegy daily  Use Albuterol inhaler 2 puffs every 4 hours as needed for SOB and wheeze  July 23, 2018- took abx but run out abx, uses norco for violent cough.  trelegy works great.  Jan 25, 2018- having ivory mucous last 6 wks with violent cough, breathing ok, appetite ok  July 25,2017-  Feeling better, having dry nonproductive cough, no abx/sterois  April 20, 2017, took tripak 3 days for 5 times and have had no problem.  Has been on digoxin for afib . Has had yellow mucous from chest with negative cultures.  Has spontneous pneumo 20 yrs ago, had tb at 38 yo.  Has had lung problems last 20 yrs with stagnant pattern. Mucous up to 1/2 cup a day.  Nov 22, 2016HPI: now cleared lingering flare, cultures neg, hydrocodone- excellent cough suppression and has 12 left. No cough, or wheeze or sob.      The chief compliant  problem is worsening  PFSH:  Past Medical History:   Diagnosis Date    Arthritis     Finger Rt hand    Atrial fibrillation 2012, 2013    Cardioversion X2  Dr. 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     LUNG SURGERY      PROSTATE SURGERY      Varicele       Social History     Tobacco Use    Smoking status: Former Smoker     Last attempt to quit: 1969     Years since quittin.4    Smokeless tobacco: Never Used   Substance Use Topics    Alcohol use: No    Drug use: No     Family History   Problem Relation Age of Onset    Kidney disease Father     Tuberculosis Father      Review of patient's allergies indicates:   Allergen Reactions    Hydrocodone Itching     I have reviewed past medical, family, and social history. I have reviewed previous nurse notes.    Performance Status:The patient's activity level is housebound activities.         Review of Systems   Constitutional: Negative for activity change, appetite change, chills, diaphoresis, fatigue, fever and unexpected weight change.   HENT: Negative for dental problem, postnasal drip, rhinorrhea, sinus pressure, sinus pain, sneezing, sore throat, trouble swallowing and voice change.    Respiratory: Negative for apnea, chest tightness, shortness of breath, wheezing and stridor.  Positive for cough  Cardiovascular: Negative for chest pain, palpitations and leg swelling.   Gastrointestinal: Negative for abdominal distention, abdominal pain, constipation and nausea.   Musculoskeletal: Negative for gait problem and neck pain.Positive for lower back and left knee pain, sharp   Skin: Negative for color change and pallor.   Allergic/Immunologic: Negative for environmental allergies and food allergies.   Neurological: Negative for dizziness, speech difficulty, weakness, light-headedness, numbness and headaches.   Hematological: Negative  "for adenopathy. Does not bruise/bleed easily.   Psychiatric/Behavioral: Negative for dysphoric mood and sleep disturbance. The patient is not nervous/anxious.           Exam:Comprehensive exam done. BP (!) 170/79 (BP Location: Right arm, Patient Position: Sitting)   Pulse (!) 56   Ht 5' 10" (1.778 m)   Wt 119.2 kg (262 lb 10.9 oz)   SpO2 95% Comment: on room air  BMI 37.69 kg/m²   Exam included Vitals as listed, and patient's appearance and affect and alertness and mood, oral exam for yeast and hygiene and pharynx lesions and Mallapatti (M) score, neck with inspection for jvd and masses and thyroid abnormalities and lymph nodes (supraclavicular and infraclavicular nodes and axillary also examined and noted if abn), chest exam included symmetry and effort and fremitus and percussion and auscultation, cardiac exam included rhythm and gallops and murmur and rubs and jvd and edema, abdominal exam for mass and hepatosplenomegaly and tenderness and hernias and bowel sounds, Musculoskeletal exam with muscle tone and posture and mobility/gait and  strength, and skin for rashes and cyanosis and pallor and turgor, extremity for clubbing.  Findings were normal except for pertinent findings listed below:  M2, min rales, chest is symmetric, no distress, normal percussion, normal fremitus back brace, no edema nor clubbing.          Radiographs (ct chest and cxr) reviewed: results reviewed   EXAMINATION:  XR CHEST PA AND LATERAL    CLINICAL HISTORY:  Bronchiectasis, uncomplicated    TECHNIQUE:  PA and lateral views of the chest were performed.    COMPARISON:  9/21/2016    FINDINGS:  The cardiomediastinal silhouette is stable.  There is hyperinflation of the lungs.  There is mild elevation of left hemidiaphragm relative to the right.  There is bilateral apical pleural and parenchymal scarring more so on the left and mild cephalad retraction of the left hilum and there is mild chronic interstitial prominence particularly " left lung base.  There are postoperative changes left hemithorax.  There is no new or confluent infiltrate or pleural effusion.      Impression       No acute cardiopulmonary disease or significant oval change compared to the prior exam.      Electronically signed by: Janette Bryson MD  Date: 07/10/2018  Time: 10:12         Labs reviewed    [facility] follows labwork: reviewed    PFT ordered and will be reviewed      Plan:  Clinical impression is apparently straight forward and impression with management as below.    Vazquez was seen today for follow-up.    Diagnoses and all orders for this visit:    Acute bilateral low back pain with left-sided sciatica        Follow up in about 3 months (around 8/19/2020).    Discussed with patient above for education the following:      Patient Instructions   Norco would be best if need on daily basis to call in next wk to notify needs- will send in appropriate norco.    Use prednisone if needed , continue trelegy.

## 2020-05-19 NOTE — PATIENT INSTRUCTIONS
Norco would be best if need on daily basis to call in next wk to notify needs- will send in appropriate norco.    Use prednisone if needed , continue trelegy.

## 2020-07-20 DIAGNOSIS — M54.42 ACUTE BILATERAL LOW BACK PAIN WITH LEFT-SIDED SCIATICA: ICD-10-CM

## 2020-07-20 RX ORDER — HYDROCODONE BITARTRATE AND ACETAMINOPHEN 5; 325 MG/1; MG/1
1 TABLET ORAL EVERY 4 HOURS PRN
Qty: 28 TABLET | Refills: 0 | Status: SHIPPED | OUTPATIENT
Start: 2020-07-20 | End: 2021-02-23 | Stop reason: SDUPTHER

## 2020-08-19 ENCOUNTER — OFFICE VISIT (OUTPATIENT)
Dept: PULMONOLOGY | Facility: CLINIC | Age: 85
End: 2020-08-19
Payer: MEDICARE

## 2020-08-19 VITALS
WEIGHT: 199.5 LBS | HEART RATE: 51 BPM | DIASTOLIC BLOOD PRESSURE: 77 MMHG | OXYGEN SATURATION: 96 % | BODY MASS INDEX: 27.02 KG/M2 | SYSTOLIC BLOOD PRESSURE: 164 MMHG | HEIGHT: 72 IN

## 2020-08-19 DIAGNOSIS — G89.29 CHRONIC MIDLINE LOW BACK PAIN WITHOUT SCIATICA: ICD-10-CM

## 2020-08-19 DIAGNOSIS — J44.9 MIXED TYPE COPD (CHRONIC OBSTRUCTIVE PULMONARY DISEASE): ICD-10-CM

## 2020-08-19 DIAGNOSIS — J47.9 BRONCHIECTASIS WITHOUT COMPLICATION: Primary | ICD-10-CM

## 2020-08-19 DIAGNOSIS — M54.50 CHRONIC MIDLINE LOW BACK PAIN WITHOUT SCIATICA: ICD-10-CM

## 2020-08-19 DIAGNOSIS — D84.9 IMMUNE DEFICIENCY DISORDER: ICD-10-CM

## 2020-08-19 PROCEDURE — 99214 PR OFFICE/OUTPT VISIT, EST, LEVL IV, 30-39 MIN: ICD-10-PCS | Mod: S$PBB,,, | Performed by: INTERNAL MEDICINE

## 2020-08-19 PROCEDURE — 99999 PR PBB SHADOW E&M-EST. PATIENT-LVL IV: CPT | Mod: PBBFAC,,, | Performed by: INTERNAL MEDICINE

## 2020-08-19 PROCEDURE — 99214 OFFICE O/P EST MOD 30 MIN: CPT | Mod: PBBFAC,PO | Performed by: INTERNAL MEDICINE

## 2020-08-19 PROCEDURE — 99999 PR PBB SHADOW E&M-EST. PATIENT-LVL IV: ICD-10-PCS | Mod: PBBFAC,,, | Performed by: INTERNAL MEDICINE

## 2020-08-19 PROCEDURE — 99214 OFFICE O/P EST MOD 30 MIN: CPT | Mod: S$PBB,,, | Performed by: INTERNAL MEDICINE

## 2020-08-19 RX ORDER — AZITHROMYCIN 500 MG/1
TABLET, FILM COATED ORAL
Qty: 3 TABLET | Refills: 5 | Status: SHIPPED | OUTPATIENT
Start: 2020-08-19 | End: 2021-02-23 | Stop reason: SDUPTHER

## 2020-08-19 RX ORDER — HYDROCODONE BITARTRATE AND ACETAMINOPHEN 5; 325 MG/1; MG/1
1 TABLET ORAL EVERY 6 HOURS PRN
Qty: 28 TABLET | Refills: 0 | Status: SHIPPED | OUTPATIENT
Start: 2020-10-19 | End: 2020-11-23 | Stop reason: SDUPTHER

## 2020-08-19 RX ORDER — HYDROCODONE BITARTRATE AND ACETAMINOPHEN 5; 325 MG/1; MG/1
1 TABLET ORAL EVERY 6 HOURS PRN
Qty: 28 TABLET | Refills: 0 | Status: SHIPPED | OUTPATIENT
Start: 2020-08-19 | End: 2020-11-23 | Stop reason: SDUPTHER

## 2020-08-19 RX ORDER — HYDROCODONE BITARTRATE AND ACETAMINOPHEN 5; 325 MG/1; MG/1
1 TABLET ORAL EVERY 6 HOURS PRN
Qty: 28 TABLET | Refills: 0 | Status: SHIPPED | OUTPATIENT
Start: 2020-09-19 | End: 2020-11-23 | Stop reason: SDUPTHER

## 2020-08-19 NOTE — PATIENT INSTRUCTIONS
Sputum culture needed.    Azithromycin daily for 3 days if needed.    norco needed 28/month, will give 3 months.    Continue trelegy.

## 2020-08-19 NOTE — PROGRESS NOTES
8/19/2020 8/19/2020    [facility]  Office Note    Chief Complaint   Patient presents with    Follow-up     3 month    Chest Congestion    Cough     hard to get sputum up       HPI:   8/19/2020 - needs norco for back pain/coughing.  levaquin not clearing yg mucous.  trelegy working.       5/19/2020 - having cough and back pain- need narcos but ran out.  Has prednisone at home, used one to 2 . Uses albuterol as needed.  trelegy daily.  No mucous.  Uses back  Brace when out.  Patient Instructions   Norco would be best if need on daily basis to call in next wk to notify needs- will send in appropriate norco.    Use prednisone if needed , continue trelegy.  2/19/2019-needed narco for back and cough problems but ran out.  Had bad cough and out of levaquin.    Patient Instructions   norco - wk worth, may need on regular basis.    Oct 15, 2019-has acute low back pain needing norco 5 intermittently - 28 pills lasted from July.  Had sob with copd flare- ppt day in [facility]- saw Dr Rob.  Took prednisone. Cleared.  No lung mucous.  Patient Instructions   norco 28 for a wk for acute pain, if chronic may refil   Use prednisone for 1-3 days if any short breath  Use levaquin for yellow mucous.  Use trelegy daily.  July 16, 2019- sciatica resolved, recommended medrol dose pack but clear.  Uses norco for cough, only got 65 pills in April with no refils.  Having am hoarseness with copious sinus drip.  Patient Instructions   Use hydrocodone cough meds  Use steroids if back or lungs flare  Suggest flonase for sinus drip.  April 15, 2019- [facility] [place] [facility]    Patient Instructions   Sent in norco 5 - 65 , then 60 & 60-- for next 3 months  April 4, 2019- major complaint left knee pain, pain 8 on 0-10 scale, onset 6 weeks, no improvement, took Norco 5-325 with slight improvement able to walk. SOB with exertion, cough nonproductive, worse during day.   Appointment Dr. Alejandro orthopedic surgeon  scheduled 4/9/19.   Addendum 2/28/2019- pt call stating back pain sciatic involvement not improved since last appointment. Requesting referral to orthopedics.  Patient Instructions   Sciatic nerve is acutely inflamed. Will order Norco 5 every 4 hours for 7 days, will get you to your appointment with Orthopedic surgeon  Continue current COPD therapy.  Jan 4, 2010 - had had up to 1/2 cup mucous/d.   H/o bronchiectiectasis. 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   Back pain limits activity. States no current cough. Using Trelegy daily. States improved breathing.  Pt states cough controlled with Norco only with Bronchitis exacerbation. 1-2 pills daily. Last used in week prior.  10/31/18 SOB-with exertion, worse at day, relieved by rest and Albuterol inhaler, albuterol 1x weekly, has had to stop fishing. Currently using treley daily.   States Trelegy is the best inhaler he has had.    Patient Instructions   Stop Azithromycin  Use Levaquin 500 mg for 5 days for yellow green mucous  Can continue for up to 7 days if needed to clear secretions.  Continue Trelegy daily  Use Albuterol inhaler 2 puffs every 4 hours as needed for SOB and wheeze  July 23, 2018- took abx but run out abx, uses norco for violent cough.  trelegy works great.  Jan 25, 2018- having ivory mucous last 6 wks with violent cough, breathing ok, appetite ok  July 25,2017-  Feeling better, having dry nonproductive cough, no abx/sterois  April 20, 2017, took tripak 3 days for 5 times and have had no problem.  Has been on digoxin for afib . Has had yellow mucous from chest with negative cultures.  Has spontneous pneumo 20 yrs ago, had tb at 38 yo.  Has had lung problems last 20 yrs with stagnant pattern. Mucous up to 1/2 cup a day.  Nov 22, 2016HPI: now cleared lingering flare, cultures neg, hydrocodone- excellent cough suppression and has 12 left. No cough, or wheeze or sob.      The chief compliant  problem is  worsening  PFSH:  Past Medical History:   Diagnosis Date    Arthritis     Finger Rt hand    Atrial fibrillation ,     Cardioversion X2  Dr. 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     LUNG SURGERY      PROSTATE SURGERY      Varicele       Social History     Tobacco Use    Smoking status: Former Smoker     Quit date: 1969     Years since quittin.6    Smokeless tobacco: Never Used   Substance Use Topics    Alcohol use: No    Drug use: No     Family History   Problem Relation Age of Onset    Kidney disease Father     Tuberculosis Father      Review of patient's allergies indicates:   Allergen Reactions    Hydrocodone Itching     I have reviewed past medical, family, and social history. I have reviewed previous nurse notes.    Performance Status:The patient's activity level is housebound activities.         Review of Systems   Constitutional: Negative for activity change, appetite change, chills, diaphoresis, fatigue, fever and unexpected weight change.   HENT: Negative for dental problem, postnasal drip, rhinorrhea, sinus pressure, sinus pain, sneezing, sore throat, trouble swallowing and voice change.    Respiratory: Negative for apnea, chest tightness, shortness of breath, wheezing and stridor.  Positive for cough  Cardiovascular: Negative for chest pain, palpitations and leg swelling.   Gastrointestinal: Negative for abdominal distention, abdominal pain, constipation and nausea.   Musculoskeletal: Negative for gait problem and  "neck pain.Positive for lower back and left knee pain, sharp   Skin: Negative for color change and pallor.   Allergic/Immunologic: Negative for environmental allergies and food allergies.   Neurological: Negative for dizziness, speech difficulty, weakness, light-headedness, numbness and headaches.   Hematological: Negative for adenopathy. Does not bruise/bleed easily.   Psychiatric/Behavioral: Negative for dysphoric mood and sleep disturbance. The patient is not nervous/anxious.           Exam:Comprehensive exam done. BP (!) 170/79 (BP Location: Right arm, Patient Position: Sitting)   Pulse (!) 56   Ht 5' 10" (1.778 m)   Wt 119.2 kg (262 lb 10.9 oz)   SpO2 95% Comment: on room air  BMI 37.69 kg/m²   Exam included Vitals as listed, and patient's appearance and affect and alertness and mood, oral exam for yeast and hygiene and pharynx lesions and Mallapatti (M) score, neck with inspection for jvd and masses and thyroid abnormalities and lymph nodes (supraclavicular and infraclavicular nodes and axillary also examined and noted if abn), chest exam included symmetry and effort and fremitus and percussion and auscultation, cardiac exam included rhythm and gallops and murmur and rubs and jvd and edema, abdominal exam for mass and hepatosplenomegaly and tenderness and hernias and bowel sounds, Musculoskeletal exam with muscle tone and posture and mobility/gait and  strength, and skin for rashes and cyanosis and pallor and turgor, extremity for clubbing.  Findings were normal except for pertinent findings listed below:  M2, min rales, chest is symmetric, no distress, normal percussion, normal fremitus back brace, no edema nor clubbing.          Radiographs (ct chest and cxr) reviewed: results reviewed   EXAMINATION:  XR CHEST PA AND LATERAL    CLINICAL HISTORY:  Bronchiectasis, uncomplicated    TECHNIQUE:  PA and lateral views of the chest were performed.    COMPARISON:  9/21/2016    FINDINGS:  The cardiomediastinal " silhouette is stable.  There is hyperinflation of the lungs.  There is mild elevation of left hemidiaphragm relative to the right.  There is bilateral apical pleural and parenchymal scarring more so on the left and mild cephalad retraction of the left hilum and there is mild chronic interstitial prominence particularly left lung base.  There are postoperative changes left hemithorax.  There is no new or confluent infiltrate or pleural effusion.      Impression       No acute cardiopulmonary disease or significant oval change compared to the prior exam.      Electronically signed by: Janette Bryson MD  Date: 07/10/2018  Time: 10:12         Labs reviewed    [facility] follows labwork: reviewed    PFT ordered and will be reviewed      Plan:  Clinical impression is apparently straight forward and impression with management as below.    Vazquez was seen today for follow-up, chest congestion and cough.    Diagnoses and all orders for this visit:    Bronchiectasis without complication  -     azithromycin (ZITHROMAX) 500 MG tablet; One daily for yellow mucous, repeat if needed  -     Culture, Respiratory with Gram Stain; Future    Immune deficiency disorder    Mixed type COPD (chronic obstructive pulmonary disease)  -     Culture, Respiratory with Gram Stain; Future  -     fluticasone-umeclidin-vilanter (TRELEGY ELLIPTA) 100-62.5-25 mcg DsDv; Inhale 1 puff into the lungs once daily.    Chronic midline low back pain without sciatica  -     HYDROcodone-acetaminophen (NORCO) 5-325 mg per tablet; Take 1 tablet by mouth every 6 (six) hours as needed for Pain.  -     HYDROcodone-acetaminophen (NORCO) 5-325 mg per tablet; Take 1 tablet by mouth every 6 (six) hours as needed for Pain.  -     HYDROcodone-acetaminophen (NORCO) 5-325 mg per tablet; Take 1 tablet by mouth every 6 (six) hours as needed for Pain.        Follow up in about 3 months (around 11/19/2020), or if symptoms worsen or fail to improve.    Discussed with patient above for  education the following:      Patient Instructions   Sputum culture needed.    Azithromycin daily for 3 days if needed.    norco needed 28/month, will give 3 months.    Continue trelegy.

## 2020-09-16 ENCOUNTER — TELEPHONE (OUTPATIENT)
Dept: PULMONOLOGY | Facility: CLINIC | Age: 85
End: 2020-09-16

## 2020-09-16 NOTE — TELEPHONE ENCOUNTER
Pt informed he was given 3 separate scrips last visit one to be filled 9/19 and one for 10/19----- Message from Candi Colunga sent at 9/16/2020  2:14 PM CDT -----  Type:  RX Refill Request    Who Called: Patient  RX Name and Strength:  HYDROcodone-acetaminophen (NORCO) 5-325 mg per tablet  Preferred Pharmacy with phone number:  [facility]  Best Call Back Number:  [phone]  Additional Information:

## 2020-11-23 ENCOUNTER — OFFICE VISIT (OUTPATIENT)
Dept: PULMONOLOGY | Facility: CLINIC | Age: 85
End: 2020-11-23
Payer: MEDICARE

## 2020-11-23 VITALS
DIASTOLIC BLOOD PRESSURE: 71 MMHG | BODY MASS INDEX: 27.43 KG/M2 | HEIGHT: 72 IN | HEART RATE: 51 BPM | SYSTOLIC BLOOD PRESSURE: 161 MMHG | WEIGHT: 202.5 LBS | OXYGEN SATURATION: 96 %

## 2020-11-23 DIAGNOSIS — J47.9 BRONCHIECTASIS WITHOUT COMPLICATION: Primary | ICD-10-CM

## 2020-11-23 DIAGNOSIS — M54.50 CHRONIC MIDLINE LOW BACK PAIN WITHOUT SCIATICA: ICD-10-CM

## 2020-11-23 DIAGNOSIS — G89.29 CHRONIC MIDLINE LOW BACK PAIN WITHOUT SCIATICA: ICD-10-CM

## 2020-11-23 DIAGNOSIS — J44.9 MIXED TYPE COPD (CHRONIC OBSTRUCTIVE PULMONARY DISEASE): ICD-10-CM

## 2020-11-23 PROCEDURE — 99214 OFFICE O/P EST MOD 30 MIN: CPT | Mod: PBBFAC,PO | Performed by: INTERNAL MEDICINE

## 2020-11-23 PROCEDURE — 99999 PR PBB SHADOW E&M-EST. PATIENT-LVL IV: CPT | Mod: PBBFAC,,, | Performed by: INTERNAL MEDICINE

## 2020-11-23 PROCEDURE — 99213 OFFICE O/P EST LOW 20 MIN: CPT | Mod: S$PBB,,, | Performed by: INTERNAL MEDICINE

## 2020-11-23 PROCEDURE — 99213 PR OFFICE/OUTPT VISIT, EST, LEVL III, 20-29 MIN: ICD-10-PCS | Mod: S$PBB,,, | Performed by: INTERNAL MEDICINE

## 2020-11-23 PROCEDURE — 99999 PR PBB SHADOW E&M-EST. PATIENT-LVL IV: ICD-10-PCS | Mod: PBBFAC,,, | Performed by: INTERNAL MEDICINE

## 2020-11-23 RX ORDER — HYDROCODONE BITARTRATE AND ACETAMINOPHEN 5; 325 MG/1; MG/1
1 TABLET ORAL EVERY 6 HOURS PRN
Qty: 60 TABLET | Refills: 0 | Status: SHIPPED | OUTPATIENT
Start: 2020-12-23 | End: 2021-03-01 | Stop reason: SDUPTHER

## 2020-11-23 RX ORDER — INFLUENZA A VIRUS A/MICHIGAN/45/2015 X-275 (H1N1) ANTIGEN (FORMALDEHYDE INACTIVATED), INFLUENZA A VIRUS A/SINGAPORE/INFIMH-16-0019/2016 IVR-186 (H3N2) ANTIGEN (FORMALDEHYDE INACTIVATED), INFLUENZA B VIRUS B/PHUKET/3073/2013 ANTIGEN (FORMALDEHYDE INACTIVATED), AND INFLUENZA B VIRUS B/MARYLAND/15/2016 BX-69A ANTIGEN (FORMALDEHYDE INACTIVATED) 60; 60; 60; 60 UG/.7ML; UG/.7ML; UG/.7ML; UG/.7ML
INJECTION, SUSPENSION INTRAMUSCULAR
COMMUNITY
Start: 2020-10-19

## 2020-11-23 RX ORDER — GABAPENTIN 300 MG/1
300 CAPSULE ORAL 3 TIMES DAILY
Qty: 270 CAPSULE | Refills: 3 | Status: SHIPPED | OUTPATIENT
Start: 2020-11-23

## 2020-11-23 RX ORDER — HYDROCODONE BITARTRATE AND ACETAMINOPHEN 5; 325 MG/1; MG/1
1 TABLET ORAL EVERY 6 HOURS PRN
Qty: 60 TABLET | Refills: 0 | Status: SHIPPED | OUTPATIENT
Start: 2020-11-23 | End: 2021-03-12 | Stop reason: SDUPTHER

## 2020-11-23 RX ORDER — HYDROCODONE BITARTRATE AND ACETAMINOPHEN 5; 325 MG/1; MG/1
1 TABLET ORAL EVERY 6 HOURS PRN
Qty: 60 TABLET | Refills: 0 | Status: SHIPPED | OUTPATIENT
Start: 2021-01-23 | End: 2021-03-01 | Stop reason: SDUPTHER

## 2020-11-23 NOTE — PROGRESS NOTES
11/23/2020 11/23/2020    [facility]  Office Note    Chief Complaint   Patient presents with    Follow-up     3 month    Chest Congestion    COPD    Bronchiectasis       HPI:     11/23/2020 having cough and cannot clear mucous.  Needs higher dose gabapentin.  Has olvera, uses norco for cough.        8/19/2020 - needs norco for back pain/coughing.  levaquin not clearing yg mucous.  trelegy working.     Patient Instructions   Sputum culture needed.    Azithromycin daily for 3 days if needed.    norco needed 28/month, will give 3 months.    Continue trelegy.      5/19/2020 - having cough and back pain- need narcos but ran out.  Has prednisone at home, used one to 2 . Uses albuterol as needed.  trelegy daily.  No mucous.  Uses back  Brace when out.  Patient Instructions   Norco would be best if need on daily basis to call in next wk to notify needs- will send in appropriate norco.    Use prednisone if needed , continue trelegy.  2/19/2019-needed narco for back and cough problems but ran out.  Had bad cough and out of levaquin.    Patient Instructions   norco - wk worth, may need on regular basis.    Oct 15, 2019-has acute low back pain needing norco 5 intermittently - 28 pills lasted from July.  Had sob with copd flare- ppt day in [facility]- saw Dr Rob.  Took prednisone. Cleared.  No lung mucous.  Patient Instructions   norco 28 for a wk for acute pain, if chronic may refil   Use prednisone for 1-3 days if any short breath  Use levaquin for yellow mucous.  Use trelegy daily.  July 16, 2019- sciatica resolved, recommended medrol dose pack but clear.  Uses norco for cough, only got 65 pills in April with no refils.  Having am hoarseness with copious sinus drip.  Patient Instructions   Use hydrocodone cough meds  Use steroids if back or lungs flare  Suggest flonase for sinus drip.  April 15, 2019- seeing Dr Alejandro In [place] - [facility].    Patient Instructions   Sent in norco 5 - 65 , then 60 & 60-- for next  3 months  April 4, 2019- major complaint left knee pain, pain 8 on 0-10 scale, onset 6 weeks, no improvement, took Norco 5-325 with slight improvement able to walk. SOB with exertion, cough nonproductive, worse during day.   Appointment Dr. Alejandro orthopedic surgeon scheduled 4/9/19.   Addendum 2/28/2019- pt call stating back pain sciatic involvement not improved since last appointment. Requesting referral to orthopedics.  Patient Instructions   Sciatic nerve is acutely inflamed. Will order Norco 5 every 4 hours for 7 days, will get you to your appointment with Orthopedic surgeon  Continue current COPD therapy.  Jan 4, 2010 - had had up to 1/2 cup mucous/d.   H/o bronchiectiectasis. 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   Back pain limits activity. States no current cough. Using Trelegy daily. States improved breathing.  Pt states cough controlled with Norco only with Bronchitis exacerbation. 1-2 pills daily. Last used in week prior.  10/31/18 SOB-with exertion, worse at day, relieved by rest and Albuterol inhaler, albuterol 1x weekly, has had to stop fishing. Currently using treley daily.   States Trelegy is the best inhaler he has had.    Patient Instructions   Stop Azithromycin  Use Levaquin 500 mg for 5 days for yellow green mucous  Can continue for up to 7 days if needed to clear secretions.  Continue Trelegy daily  Use Albuterol inhaler 2 puffs every 4 hours as needed for SOB and wheeze  July 23, 2018- took abx but run out abx, uses norco for violent cough.  trelegy works great.  Jan 25, 2018- having ivory mucous last 6 wks with violent cough, breathing ok, appetite ok  July 25,2017-  Feeling better, having dry nonproductive cough, no abx/sterois  April 20, 2017, took tripak 3 days for 5 times and have had no problem.  Has been on digoxin for afib . Has had yellow mucous from chest with negative cultures.  Has spontneous pneumo 20 yrs ago, had tb at 38 yo.  Has had  lung problems last 20 yrs with stagnant pattern. Mucous up to 1/2 cup a day.  2016HPI: now cleared lingering flare, cultures neg, hydrocodone- excellent cough suppression and has 12 left. No cough, or wheeze or sob.      The chief compliant  problem is worsening  PFSH:  Past Medical History:   Diagnosis Date    Arthritis     Finger Rt hand    Atrial fibrillation ,     Cardioversion X2  Dr. 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     LUNG SURGERY      PROSTATE SURGERY      Varicele       Social History     Tobacco Use    Smoking status: Former Smoker     Quit date: 1969     Years since quittin.9    Smokeless tobacco: Never Used   Substance Use Topics    Alcohol use: No    Drug use: No     Family History   Problem Relation Age of Onset    Kidney disease Father     Tuberculosis Father      Review of patient's allergies indicates:   Allergen Reactions    Hydrocodone Itching     I have reviewed past medical, family, and social history. I have reviewed previous nurse notes.    Performance Status:The patient's activity level is housebound activities.         Review of Systems   Constitutional: Negative for activity change, appetite change, chills, diaphoresis, fatigue, fever and unexpected weight change.   HENT: Negative for dental problem, postnasal drip, rhinorrhea, sinus pressure, sinus pain, sneezing, sore throat, trouble swallowing and voice change.    Respiratory: Negative for apnea, chest tightness, shortness of breath,  "wheezing and stridor.  Positive for cough  Cardiovascular: Negative for chest pain, palpitations and leg swelling.   Gastrointestinal: Negative for abdominal distention, abdominal pain, constipation and nausea.   Musculoskeletal: Negative for gait problem and neck pain.Positive for lower back and left knee pain, sharp   Skin: Negative for color change and pallor.   Allergic/Immunologic: Negative for environmental allergies and food allergies.   Neurological: Negative for dizziness, speech difficulty, weakness, light-headedness, numbness and headaches.   Hematological: Negative for adenopathy. Does not bruise/bleed easily.   Psychiatric/Behavioral: Negative for dysphoric mood and sleep disturbance. The patient is not nervous/anxious.           Exam:Comprehensive exam done. BP (!) 170/79 (BP Location: Right arm, Patient Position: Sitting)   Pulse (!) 56   Ht 5' 10" (1.778 m)   Wt 119.2 kg (262 lb 10.9 oz)   SpO2 95% Comment: on room air  BMI 37.69 kg/m²   Exam included Vitals as listed, and patient's appearance and affect and alertness and mood, oral exam for yeast and hygiene and pharynx lesions and Mallapatti (M) score, neck with inspection for jvd and masses and thyroid abnormalities and lymph nodes (supraclavicular and infraclavicular nodes and axillary also examined and noted if abn), chest exam included symmetry and effort and fremitus and percussion and auscultation, cardiac exam included rhythm and gallops and murmur and rubs and jvd and edema, abdominal exam for mass and hepatosplenomegaly and tenderness and hernias and bowel sounds, Musculoskeletal exam with muscle tone and posture and mobility/gait and  strength, and skin for rashes and cyanosis and pallor and turgor, extremity for clubbing.  Findings were normal except for pertinent findings listed below:  M2, min rales, chest is symmetric, no distress, normal percussion, normal fremitus back brace, no edema nor clubbing.          Radiographs (ct " chest and cxr) reviewed: results reviewed   EXAMINATION:  XR CHEST PA AND LATERAL    CLINICAL HISTORY:  Bronchiectasis, uncomplicated    TECHNIQUE:  PA and lateral views of the chest were performed.    COMPARISON:  9/21/2016    FINDINGS:  The cardiomediastinal silhouette is stable.  There is hyperinflation of the lungs.  There is mild elevation of left hemidiaphragm relative to the right.  There is bilateral apical pleural and parenchymal scarring more so on the left and mild cephalad retraction of the left hilum and there is mild chronic interstitial prominence particularly left lung base.  There are postoperative changes left hemithorax.  There is no new or confluent infiltrate or pleural effusion.      Impression       No acute cardiopulmonary disease or significant oval change compared to the prior exam.      Electronically signed by: Janette Bryson MD  Date: 07/10/2018  Time: 10:12         Labs reviewed    [facility] follows labwork: reviewed    PFT ordered and will be reviewed      Plan:  Clinical impression is apparently straight forward and impression with management as below.    Vazquez was seen today for follow-up, chest congestion, copd and bronchiectasis.    Diagnoses and all orders for this visit:    Chronic midline low back pain without sciatica  -     HYDROcodone-acetaminophen (NORCO) 5-325 mg per tablet; Take 1 tablet by mouth every 6 (six) hours as needed for Pain.  -     HYDROcodone-acetaminophen (NORCO) 5-325 mg per tablet; Take 1 tablet by mouth every 6 (six) hours as needed for Pain.  -     HYDROcodone-acetaminophen (NORCO) 5-325 mg per tablet; Take 1 tablet by mouth every 6 (six) hours as needed for Pain.    Other orders  -     gabapentin (NEURONTIN) 300 MG capsule; Take 1 capsule (300 mg total) by mouth 3 (three) times daily.        Follow up in about 3 months (around 2/23/2021), or if symptoms worsen or fail to improve.    Discussed with patient above for education the following:      Patient  Instructions   Increase norco to 60/month,    Increase gabapentin - 300  - three times daily.

## 2021-02-23 ENCOUNTER — OFFICE VISIT (OUTPATIENT)
Dept: PULMONOLOGY | Facility: CLINIC | Age: 86
End: 2021-02-23
Payer: MEDICARE

## 2021-02-23 VITALS
BODY MASS INDEX: 28.5 KG/M2 | DIASTOLIC BLOOD PRESSURE: 70 MMHG | SYSTOLIC BLOOD PRESSURE: 152 MMHG | HEART RATE: 60 BPM | WEIGHT: 210.44 LBS | HEIGHT: 72 IN | OXYGEN SATURATION: 95 %

## 2021-02-23 DIAGNOSIS — J47.9 BRONCHIECTASIS WITHOUT COMPLICATION: ICD-10-CM

## 2021-02-23 DIAGNOSIS — G89.29 CHRONIC MIDLINE LOW BACK PAIN WITHOUT SCIATICA: ICD-10-CM

## 2021-02-23 DIAGNOSIS — J41.1 CHRONIC BRONCHITIS, MUCOPURULENT: ICD-10-CM

## 2021-02-23 DIAGNOSIS — D84.9 IMMUNE DEFICIENCY DISORDER: ICD-10-CM

## 2021-02-23 DIAGNOSIS — J44.9 MIXED TYPE COPD (CHRONIC OBSTRUCTIVE PULMONARY DISEASE): ICD-10-CM

## 2021-02-23 DIAGNOSIS — M54.42 ACUTE BILATERAL LOW BACK PAIN WITH LEFT-SIDED SCIATICA: ICD-10-CM

## 2021-02-23 DIAGNOSIS — M54.41 ACUTE RIGHT-SIDED LOW BACK PAIN WITH RIGHT-SIDED SCIATICA: Primary | ICD-10-CM

## 2021-02-23 DIAGNOSIS — M54.50 CHRONIC MIDLINE LOW BACK PAIN WITHOUT SCIATICA: ICD-10-CM

## 2021-02-23 PROCEDURE — 3288F FALL RISK ASSESSMENT DOCD: CPT | Mod: CPTII,S$GLB,, | Performed by: INTERNAL MEDICINE

## 2021-02-23 PROCEDURE — 1101F PR PT FALLS ASSESS DOC 0-1 FALLS W/OUT INJ PAST YR: ICD-10-PCS | Mod: CPTII,S$GLB,, | Performed by: INTERNAL MEDICINE

## 2021-02-23 PROCEDURE — 1159F MED LIST DOCD IN RCRD: CPT | Mod: S$GLB,,, | Performed by: INTERNAL MEDICINE

## 2021-02-23 PROCEDURE — 1101F PT FALLS ASSESS-DOCD LE1/YR: CPT | Mod: CPTII,S$GLB,, | Performed by: INTERNAL MEDICINE

## 2021-02-23 PROCEDURE — 1159F PR MEDICATION LIST DOCUMENTED IN MEDICAL RECORD: ICD-10-PCS | Mod: S$GLB,,, | Performed by: INTERNAL MEDICINE

## 2021-02-23 PROCEDURE — 99214 PR OFFICE/OUTPT VISIT, EST, LEVL IV, 30-39 MIN: ICD-10-PCS | Mod: S$GLB,,, | Performed by: INTERNAL MEDICINE

## 2021-02-23 PROCEDURE — 99999 PR PBB SHADOW E&M-EST. PATIENT-LVL IV: ICD-10-PCS | Mod: PBBFAC,,, | Performed by: INTERNAL MEDICINE

## 2021-02-23 PROCEDURE — 99214 OFFICE O/P EST MOD 30 MIN: CPT | Mod: S$GLB,,, | Performed by: INTERNAL MEDICINE

## 2021-02-23 PROCEDURE — 1125F PR PAIN SEVERITY QUANTIFIED, PAIN PRESENT: ICD-10-PCS | Mod: S$GLB,,, | Performed by: INTERNAL MEDICINE

## 2021-02-23 PROCEDURE — 99999 PR PBB SHADOW E&M-EST. PATIENT-LVL IV: CPT | Mod: PBBFAC,,, | Performed by: INTERNAL MEDICINE

## 2021-02-23 PROCEDURE — 3288F PR FALLS RISK ASSESSMENT DOCUMENTED: ICD-10-PCS | Mod: CPTII,S$GLB,, | Performed by: INTERNAL MEDICINE

## 2021-02-23 PROCEDURE — 1125F AMNT PAIN NOTED PAIN PRSNT: CPT | Mod: S$GLB,,, | Performed by: INTERNAL MEDICINE

## 2021-02-23 RX ORDER — HYDROCODONE BITARTRATE AND ACETAMINOPHEN 5; 325 MG/1; MG/1
1 TABLET ORAL EVERY 6 HOURS PRN
Qty: 28 TABLET | Refills: 0 | Status: SHIPPED | OUTPATIENT
Start: 2021-02-23 | End: 2021-03-01 | Stop reason: SDUPTHER

## 2021-02-23 RX ORDER — AZITHROMYCIN 500 MG/1
500 TABLET, FILM COATED ORAL
Qty: 12 TABLET | Refills: 5 | Status: SHIPPED | OUTPATIENT
Start: 2021-02-24 | End: 2021-06-01 | Stop reason: SDUPTHER

## 2021-02-23 RX ORDER — FLUTICASONE PROPIONATE 0.5 MG/ML
LOTION TOPICAL
COMMUNITY
Start: 2021-02-04

## 2021-03-01 ENCOUNTER — OFFICE VISIT (OUTPATIENT)
Dept: CARDIOLOGY | Facility: CLINIC | Age: 86
End: 2021-03-01
Payer: MEDICARE

## 2021-03-01 VITALS
HEART RATE: 82 BPM | HEIGHT: 72 IN | BODY MASS INDEX: 28.44 KG/M2 | SYSTOLIC BLOOD PRESSURE: 132 MMHG | DIASTOLIC BLOOD PRESSURE: 70 MMHG | OXYGEN SATURATION: 95 % | WEIGHT: 210 LBS

## 2021-03-01 DIAGNOSIS — J44.9 MIXED TYPE COPD (CHRONIC OBSTRUCTIVE PULMONARY DISEASE): ICD-10-CM

## 2021-03-01 DIAGNOSIS — E78.5 HYPERLIPIDEMIA, UNSPECIFIED HYPERLIPIDEMIA TYPE: ICD-10-CM

## 2021-03-01 DIAGNOSIS — I10 ESSENTIAL HYPERTENSION: ICD-10-CM

## 2021-03-01 DIAGNOSIS — I48.0 PAROXYSMAL ATRIAL FIBRILLATION: ICD-10-CM

## 2021-03-01 PROCEDURE — 1159F PR MEDICATION LIST DOCUMENTED IN MEDICAL RECORD: ICD-10-PCS | Mod: S$GLB,,, | Performed by: INTERNAL MEDICINE

## 2021-03-01 PROCEDURE — 99214 PR OFFICE/OUTPT VISIT, EST, LEVL IV, 30-39 MIN: ICD-10-PCS | Mod: S$GLB,,, | Performed by: INTERNAL MEDICINE

## 2021-03-01 PROCEDURE — 99214 OFFICE O/P EST MOD 30 MIN: CPT | Mod: S$GLB,,, | Performed by: INTERNAL MEDICINE

## 2021-03-01 PROCEDURE — 1101F PR PT FALLS ASSESS DOC 0-1 FALLS W/OUT INJ PAST YR: ICD-10-PCS | Mod: CPTII,S$GLB,, | Performed by: INTERNAL MEDICINE

## 2021-03-01 PROCEDURE — 3288F PR FALLS RISK ASSESSMENT DOCUMENTED: ICD-10-PCS | Mod: CPTII,S$GLB,, | Performed by: INTERNAL MEDICINE

## 2021-03-01 PROCEDURE — 3288F FALL RISK ASSESSMENT DOCD: CPT | Mod: CPTII,S$GLB,, | Performed by: INTERNAL MEDICINE

## 2021-03-01 PROCEDURE — 1159F MED LIST DOCD IN RCRD: CPT | Mod: S$GLB,,, | Performed by: INTERNAL MEDICINE

## 2021-03-01 PROCEDURE — 1101F PT FALLS ASSESS-DOCD LE1/YR: CPT | Mod: CPTII,S$GLB,, | Performed by: INTERNAL MEDICINE

## 2021-03-10 ENCOUNTER — TELEPHONE (OUTPATIENT)
Dept: PULMONOLOGY | Facility: CLINIC | Age: 86
End: 2021-03-10

## 2021-03-12 ENCOUNTER — TELEPHONE (OUTPATIENT)
Dept: PULMONOLOGY | Facility: CLINIC | Age: 86
End: 2021-03-12

## 2021-03-12 DIAGNOSIS — G89.29 CHRONIC MIDLINE LOW BACK PAIN WITHOUT SCIATICA: ICD-10-CM

## 2021-03-12 DIAGNOSIS — M54.50 CHRONIC MIDLINE LOW BACK PAIN WITHOUT SCIATICA: ICD-10-CM

## 2021-03-12 RX ORDER — HYDROCODONE BITARTRATE AND ACETAMINOPHEN 5; 325 MG/1; MG/1
1 TABLET ORAL EVERY 6 HOURS PRN
Qty: 40 TABLET | Refills: 0 | Status: SHIPPED | OUTPATIENT
Start: 2021-04-12 | End: 2021-06-01 | Stop reason: SDUPTHER

## 2021-03-12 RX ORDER — HYDROCODONE BITARTRATE AND ACETAMINOPHEN 5; 325 MG/1; MG/1
1 TABLET ORAL EVERY 6 HOURS PRN
Qty: 40 TABLET | Refills: 0 | Status: SHIPPED | OUTPATIENT
Start: 2021-03-12 | End: 2021-06-01 | Stop reason: SDUPTHER

## 2021-03-12 RX ORDER — HYDROCODONE BITARTRATE AND ACETAMINOPHEN 5; 325 MG/1; MG/1
1 TABLET ORAL EVERY 6 HOURS PRN
Qty: 60 TABLET | Refills: 0 | Status: CANCELLED | OUTPATIENT
Start: 2021-03-12

## 2021-06-01 ENCOUNTER — OFFICE VISIT (OUTPATIENT)
Dept: PULMONOLOGY | Facility: CLINIC | Age: 86
End: 2021-06-01
Payer: MEDICARE

## 2021-06-01 VITALS
HEIGHT: 72 IN | DIASTOLIC BLOOD PRESSURE: 78 MMHG | BODY MASS INDEX: 28.33 KG/M2 | SYSTOLIC BLOOD PRESSURE: 193 MMHG | WEIGHT: 209.13 LBS | OXYGEN SATURATION: 97 % | HEART RATE: 56 BPM

## 2021-06-01 DIAGNOSIS — J47.9 BRONCHIECTASIS WITHOUT COMPLICATION: Primary | ICD-10-CM

## 2021-06-01 DIAGNOSIS — J41.1 CHRONIC BRONCHITIS, MUCOPURULENT: ICD-10-CM

## 2021-06-01 DIAGNOSIS — M54.50 CHRONIC MIDLINE LOW BACK PAIN WITHOUT SCIATICA: ICD-10-CM

## 2021-06-01 DIAGNOSIS — D84.9 IMMUNE DEFICIENCY DISORDER: ICD-10-CM

## 2021-06-01 DIAGNOSIS — I48.0 PAROXYSMAL ATRIAL FIBRILLATION: ICD-10-CM

## 2021-06-01 DIAGNOSIS — G89.29 CHRONIC MIDLINE LOW BACK PAIN WITHOUT SCIATICA: ICD-10-CM

## 2021-06-01 DIAGNOSIS — R11.0 NAUSEA: ICD-10-CM

## 2021-06-01 PROCEDURE — 99999 PR PBB SHADOW E&M-EST. PATIENT-LVL IV: ICD-10-PCS | Mod: PBBFAC,,, | Performed by: INTERNAL MEDICINE

## 2021-06-01 PROCEDURE — 1125F AMNT PAIN NOTED PAIN PRSNT: CPT | Mod: S$GLB,,, | Performed by: INTERNAL MEDICINE

## 2021-06-01 PROCEDURE — 99213 OFFICE O/P EST LOW 20 MIN: CPT | Mod: S$GLB,,, | Performed by: INTERNAL MEDICINE

## 2021-06-01 PROCEDURE — 1125F PR PAIN SEVERITY QUANTIFIED, PAIN PRESENT: ICD-10-PCS | Mod: S$GLB,,, | Performed by: INTERNAL MEDICINE

## 2021-06-01 PROCEDURE — 3288F PR FALLS RISK ASSESSMENT DOCUMENTED: ICD-10-PCS | Mod: CPTII,S$GLB,, | Performed by: INTERNAL MEDICINE

## 2021-06-01 PROCEDURE — 3288F FALL RISK ASSESSMENT DOCD: CPT | Mod: CPTII,S$GLB,, | Performed by: INTERNAL MEDICINE

## 2021-06-01 PROCEDURE — 1159F MED LIST DOCD IN RCRD: CPT | Mod: S$GLB,,, | Performed by: INTERNAL MEDICINE

## 2021-06-01 PROCEDURE — 1101F PT FALLS ASSESS-DOCD LE1/YR: CPT | Mod: CPTII,S$GLB,, | Performed by: INTERNAL MEDICINE

## 2021-06-01 PROCEDURE — 99213 PR OFFICE/OUTPT VISIT, EST, LEVL III, 20-29 MIN: ICD-10-PCS | Mod: S$GLB,,, | Performed by: INTERNAL MEDICINE

## 2021-06-01 PROCEDURE — 99999 PR PBB SHADOW E&M-EST. PATIENT-LVL IV: CPT | Mod: PBBFAC,,, | Performed by: INTERNAL MEDICINE

## 2021-06-01 PROCEDURE — 1101F PR PT FALLS ASSESS DOC 0-1 FALLS W/OUT INJ PAST YR: ICD-10-PCS | Mod: CPTII,S$GLB,, | Performed by: INTERNAL MEDICINE

## 2021-06-01 PROCEDURE — 1159F PR MEDICATION LIST DOCUMENTED IN MEDICAL RECORD: ICD-10-PCS | Mod: S$GLB,,, | Performed by: INTERNAL MEDICINE

## 2021-06-01 RX ORDER — HYDROCODONE BITARTRATE AND ACETAMINOPHEN 5; 325 MG/1; MG/1
1 TABLET ORAL EVERY 6 HOURS PRN
Qty: 40 TABLET | Refills: 0 | Status: SHIPPED | OUTPATIENT
Start: 2021-06-01 | End: 2021-10-04 | Stop reason: SDUPTHER

## 2021-06-01 RX ORDER — HYDROCODONE BITARTRATE AND ACETAMINOPHEN 5; 325 MG/1; MG/1
1 TABLET ORAL EVERY 6 HOURS PRN
Qty: 40 TABLET | Refills: 0 | Status: SHIPPED | OUTPATIENT
Start: 2021-08-01 | End: 2021-10-04 | Stop reason: SDUPTHER

## 2021-06-01 RX ORDER — AZITHROMYCIN 500 MG/1
500 TABLET, FILM COATED ORAL DAILY
Qty: 30 TABLET | Refills: 5 | Status: SHIPPED | OUTPATIENT
Start: 2021-06-01

## 2021-06-01 RX ORDER — ONDANSETRON 4 MG/1
4 TABLET, FILM COATED ORAL EVERY 8 HOURS PRN
Qty: 30 TABLET | Refills: 1 | Status: SHIPPED | OUTPATIENT
Start: 2021-06-01

## 2021-06-01 RX ORDER — HYDROCODONE BITARTRATE AND ACETAMINOPHEN 5; 325 MG/1; MG/1
1 TABLET ORAL EVERY 6 HOURS PRN
Qty: 40 TABLET | Refills: 0 | Status: SHIPPED | OUTPATIENT
Start: 2021-07-01 | End: 2021-10-04 | Stop reason: SDUPTHER

## 2021-08-26 LAB
ALBUMIN SERPL-MCNC: 4.5 G/DL (ref 3.6–4.6)
ALBUMIN/GLOB SERPL: 1.7 {RATIO} (ref 1.2–2.2)
ALP SERPL-CCNC: 73 IU/L (ref 48–121)
ALT SERPL-CCNC: 14 IU/L (ref 0–44)
AST SERPL-CCNC: 19 IU/L (ref 0–40)
BILIRUB SERPL-MCNC: 0.7 MG/DL (ref 0–1.2)
BUN SERPL-MCNC: 21 MG/DL (ref 8–27)
BUN/CREAT SERPL: 12 (ref 10–24)
CALCIUM SERPL-MCNC: 10 MG/DL (ref 8.6–10.2)
CHLORIDE SERPL-SCNC: 101 MMOL/L (ref 96–106)
CHOLEST SERPL-MCNC: 158 MG/DL (ref 100–199)
CO2 SERPL-SCNC: 22 MMOL/L (ref 20–29)
CREAT SERPL-MCNC: 1.76 MG/DL (ref 0.76–1.27)
ERYTHROCYTE [DISTWIDTH] IN BLOOD BY AUTOMATED COUNT: 12.6 % (ref 11.6–15.4)
GLOBULIN SER CALC-MCNC: 2.7 G/DL (ref 1.5–4.5)
GLUCOSE SERPL-MCNC: 140 MG/DL (ref 65–99)
HCT VFR BLD AUTO: 48 % (ref 37.5–51)
HDLC SERPL-MCNC: 29 MG/DL
HGB BLD-MCNC: 16 G/DL (ref 13–17.7)
LDLC SERPL CALC-MCNC: 94 MG/DL (ref 0–99)
MCH RBC QN AUTO: 30.5 PG (ref 26.6–33)
MCHC RBC AUTO-ENTMCNC: 33.3 G/DL (ref 31.5–35.7)
MCV RBC AUTO: 91 FL (ref 79–97)
PLATELET # BLD AUTO: 239 X10E3/UL (ref 150–450)
POTASSIUM SERPL-SCNC: 4.5 MMOL/L (ref 3.5–5.2)
PROT SERPL-MCNC: 7.2 G/DL (ref 6–8.5)
RBC # BLD AUTO: 5.25 X10E6/UL (ref 4.14–5.8)
SODIUM SERPL-SCNC: 140 MMOL/L (ref 134–144)
TRIGL SERPL-MCNC: 206 MG/DL (ref 0–149)
VLDLC SERPL CALC-MCNC: 35 MG/DL (ref 5–40)
WBC # BLD AUTO: 8.1 X10E3/UL (ref 3.4–10.8)

## 2021-09-02 ENCOUNTER — OFFICE VISIT (OUTPATIENT)
Dept: CARDIOLOGY | Facility: CLINIC | Age: 86
End: 2021-09-02
Payer: MEDICARE

## 2021-09-02 VITALS
HEIGHT: 72 IN | SYSTOLIC BLOOD PRESSURE: 140 MMHG | DIASTOLIC BLOOD PRESSURE: 70 MMHG | OXYGEN SATURATION: 95 % | BODY MASS INDEX: 28.04 KG/M2 | WEIGHT: 207 LBS | HEART RATE: 64 BPM

## 2021-09-02 DIAGNOSIS — I10 ESSENTIAL HYPERTENSION: ICD-10-CM

## 2021-09-02 DIAGNOSIS — N18.32 TYPE 2 DIABETES MELLITUS WITH STAGE 3B CHRONIC KIDNEY DISEASE, WITHOUT LONG-TERM CURRENT USE OF INSULIN: ICD-10-CM

## 2021-09-02 DIAGNOSIS — J44.9 MIXED TYPE COPD (CHRONIC OBSTRUCTIVE PULMONARY DISEASE): ICD-10-CM

## 2021-09-02 DIAGNOSIS — N18.32 STAGE 3B CHRONIC KIDNEY DISEASE: ICD-10-CM

## 2021-09-02 DIAGNOSIS — E11.22 TYPE 2 DIABETES MELLITUS WITH STAGE 3B CHRONIC KIDNEY DISEASE, WITHOUT LONG-TERM CURRENT USE OF INSULIN: ICD-10-CM

## 2021-09-02 DIAGNOSIS — E78.5 HYPERLIPIDEMIA, UNSPECIFIED HYPERLIPIDEMIA TYPE: ICD-10-CM

## 2021-09-02 DIAGNOSIS — I35.0 AORTIC VALVE STENOSIS, ETIOLOGY OF CARDIAC VALVE DISEASE UNSPECIFIED: ICD-10-CM

## 2021-09-02 DIAGNOSIS — I48.0 PAROXYSMAL ATRIAL FIBRILLATION: Primary | ICD-10-CM

## 2021-09-02 PROCEDURE — 1126F AMNT PAIN NOTED NONE PRSNT: CPT | Mod: CPTII,S$GLB,, | Performed by: INTERNAL MEDICINE

## 2021-09-02 PROCEDURE — 1101F PT FALLS ASSESS-DOCD LE1/YR: CPT | Mod: CPTII,S$GLB,, | Performed by: INTERNAL MEDICINE

## 2021-09-02 PROCEDURE — 1126F PR PAIN SEVERITY QUANTIFIED, NO PAIN PRESENT: ICD-10-PCS | Mod: CPTII,S$GLB,, | Performed by: INTERNAL MEDICINE

## 2021-09-02 PROCEDURE — 99214 OFFICE O/P EST MOD 30 MIN: CPT | Mod: S$GLB,,, | Performed by: INTERNAL MEDICINE

## 2021-09-02 PROCEDURE — 1101F PR PT FALLS ASSESS DOC 0-1 FALLS W/OUT INJ PAST YR: ICD-10-PCS | Mod: CPTII,S$GLB,, | Performed by: INTERNAL MEDICINE

## 2021-09-02 PROCEDURE — 3288F PR FALLS RISK ASSESSMENT DOCUMENTED: ICD-10-PCS | Mod: CPTII,S$GLB,, | Performed by: INTERNAL MEDICINE

## 2021-09-02 PROCEDURE — 1160F RVW MEDS BY RX/DR IN RCRD: CPT | Mod: CPTII,S$GLB,, | Performed by: INTERNAL MEDICINE

## 2021-09-02 PROCEDURE — 1160F PR REVIEW ALL MEDS BY PRESCRIBER/CLIN PHARMACIST DOCUMENTED: ICD-10-PCS | Mod: CPTII,S$GLB,, | Performed by: INTERNAL MEDICINE

## 2021-09-02 PROCEDURE — 1159F MED LIST DOCD IN RCRD: CPT | Mod: CPTII,S$GLB,, | Performed by: INTERNAL MEDICINE

## 2021-09-02 PROCEDURE — 99214 PR OFFICE/OUTPT VISIT, EST, LEVL IV, 30-39 MIN: ICD-10-PCS | Mod: S$GLB,,, | Performed by: INTERNAL MEDICINE

## 2021-09-02 PROCEDURE — 3288F FALL RISK ASSESSMENT DOCD: CPT | Mod: CPTII,S$GLB,, | Performed by: INTERNAL MEDICINE

## 2021-09-02 PROCEDURE — 1159F PR MEDICATION LIST DOCUMENTED IN MEDICAL RECORD: ICD-10-PCS | Mod: CPTII,S$GLB,, | Performed by: INTERNAL MEDICINE

## 2021-09-02 RX ORDER — AMLODIPINE BESYLATE 5 MG/1
5 TABLET ORAL DAILY
Qty: 90 TABLET | Refills: 3 | Status: SHIPPED | OUTPATIENT
Start: 2021-09-02

## 2021-09-27 DIAGNOSIS — J44.9 MIXED TYPE COPD (CHRONIC OBSTRUCTIVE PULMONARY DISEASE): ICD-10-CM

## 2021-10-01 DIAGNOSIS — M54.50 CHRONIC MIDLINE LOW BACK PAIN WITHOUT SCIATICA: ICD-10-CM

## 2021-10-01 DIAGNOSIS — G89.29 CHRONIC MIDLINE LOW BACK PAIN WITHOUT SCIATICA: ICD-10-CM

## 2021-10-01 RX ORDER — HYDROCODONE BITARTRATE AND ACETAMINOPHEN 5; 325 MG/1; MG/1
1 TABLET ORAL EVERY 6 HOURS PRN
Qty: 40 TABLET | Refills: 0 | OUTPATIENT
Start: 2021-10-01

## 2021-10-04 ENCOUNTER — OFFICE VISIT (OUTPATIENT)
Dept: PULMONOLOGY | Facility: CLINIC | Age: 86
End: 2021-10-04
Payer: MEDICARE

## 2021-10-04 VITALS
DIASTOLIC BLOOD PRESSURE: 77 MMHG | WEIGHT: 208.88 LBS | OXYGEN SATURATION: 96 % | HEART RATE: 55 BPM | BODY MASS INDEX: 28.29 KG/M2 | HEIGHT: 72 IN | SYSTOLIC BLOOD PRESSURE: 182 MMHG

## 2021-10-04 DIAGNOSIS — G89.29 CHRONIC MIDLINE LOW BACK PAIN WITHOUT SCIATICA: ICD-10-CM

## 2021-10-04 DIAGNOSIS — J44.9 MIXED TYPE COPD (CHRONIC OBSTRUCTIVE PULMONARY DISEASE): ICD-10-CM

## 2021-10-04 DIAGNOSIS — J47.9 BRONCHIECTASIS WITHOUT COMPLICATION: ICD-10-CM

## 2021-10-04 DIAGNOSIS — R60.0 LEG EDEMA: Primary | ICD-10-CM

## 2021-10-04 DIAGNOSIS — J41.1 CHRONIC BRONCHITIS, MUCOPURULENT: ICD-10-CM

## 2021-10-04 DIAGNOSIS — M54.50 CHRONIC MIDLINE LOW BACK PAIN WITHOUT SCIATICA: ICD-10-CM

## 2021-10-04 PROCEDURE — 3288F FALL RISK ASSESSMENT DOCD: CPT | Mod: CPTII,S$GLB,, | Performed by: INTERNAL MEDICINE

## 2021-10-04 PROCEDURE — 99213 OFFICE O/P EST LOW 20 MIN: CPT | Mod: S$GLB,,, | Performed by: INTERNAL MEDICINE

## 2021-10-04 PROCEDURE — 1125F AMNT PAIN NOTED PAIN PRSNT: CPT | Mod: CPTII,S$GLB,, | Performed by: INTERNAL MEDICINE

## 2021-10-04 PROCEDURE — 1101F PR PT FALLS ASSESS DOC 0-1 FALLS W/OUT INJ PAST YR: ICD-10-PCS | Mod: CPTII,S$GLB,, | Performed by: INTERNAL MEDICINE

## 2021-10-04 PROCEDURE — 3288F PR FALLS RISK ASSESSMENT DOCUMENTED: ICD-10-PCS | Mod: CPTII,S$GLB,, | Performed by: INTERNAL MEDICINE

## 2021-10-04 PROCEDURE — 1159F PR MEDICATION LIST DOCUMENTED IN MEDICAL RECORD: ICD-10-PCS | Mod: CPTII,S$GLB,, | Performed by: INTERNAL MEDICINE

## 2021-10-04 PROCEDURE — 99213 PR OFFICE/OUTPT VISIT, EST, LEVL III, 20-29 MIN: ICD-10-PCS | Mod: S$GLB,,, | Performed by: INTERNAL MEDICINE

## 2021-10-04 PROCEDURE — 1159F MED LIST DOCD IN RCRD: CPT | Mod: CPTII,S$GLB,, | Performed by: INTERNAL MEDICINE

## 2021-10-04 PROCEDURE — 99999 PR PBB SHADOW E&M-EST. PATIENT-LVL IV: ICD-10-PCS | Mod: PBBFAC,,, | Performed by: INTERNAL MEDICINE

## 2021-10-04 PROCEDURE — 1101F PT FALLS ASSESS-DOCD LE1/YR: CPT | Mod: CPTII,S$GLB,, | Performed by: INTERNAL MEDICINE

## 2021-10-04 PROCEDURE — 1125F PR PAIN SEVERITY QUANTIFIED, PAIN PRESENT: ICD-10-PCS | Mod: CPTII,S$GLB,, | Performed by: INTERNAL MEDICINE

## 2021-10-04 PROCEDURE — 99999 PR PBB SHADOW E&M-EST. PATIENT-LVL IV: CPT | Mod: PBBFAC,,, | Performed by: INTERNAL MEDICINE

## 2021-10-04 RX ORDER — HYDROCODONE BITARTRATE AND ACETAMINOPHEN 5; 325 MG/1; MG/1
1 TABLET ORAL EVERY 6 HOURS PRN
Qty: 60 TABLET | Refills: 0 | Status: SHIPPED | OUTPATIENT
Start: 2021-12-04

## 2021-10-04 RX ORDER — FUROSEMIDE 20 MG/1
20 TABLET ORAL DAILY PRN
Qty: 30 TABLET | Refills: 11 | Status: SHIPPED | OUTPATIENT
Start: 2021-10-04 | End: 2022-10-04

## 2021-10-04 RX ORDER — PREDNISONE 20 MG/1
TABLET ORAL
Qty: 21 TABLET | Refills: 2 | Status: SHIPPED | OUTPATIENT
Start: 2021-10-04

## 2021-10-04 RX ORDER — HYDROCODONE BITARTRATE AND ACETAMINOPHEN 5; 325 MG/1; MG/1
1 TABLET ORAL EVERY 6 HOURS PRN
Qty: 60 TABLET | Refills: 0 | Status: SHIPPED | OUTPATIENT
Start: 2021-11-04

## 2021-10-04 RX ORDER — HYDROCODONE BITARTRATE AND ACETAMINOPHEN 5; 325 MG/1; MG/1
1 TABLET ORAL EVERY 6 HOURS PRN
Qty: 60 TABLET | Refills: 0 | Status: SHIPPED | OUTPATIENT
Start: 2021-10-04

## 2021-10-12 ENCOUNTER — LAB VISIT (OUTPATIENT)
Dept: LAB | Facility: HOSPITAL | Age: 86
End: 2021-10-12
Attending: INTERNAL MEDICINE
Payer: MEDICARE

## 2021-10-12 DIAGNOSIS — E78.5 HYPERLIPIDEMIA, UNSPECIFIED HYPERLIPIDEMIA TYPE: ICD-10-CM

## 2021-10-12 DIAGNOSIS — N18.32 TYPE 2 DIABETES MELLITUS WITH STAGE 3B CHRONIC KIDNEY DISEASE, WITHOUT LONG-TERM CURRENT USE OF INSULIN: ICD-10-CM

## 2021-10-12 DIAGNOSIS — I48.0 PAROXYSMAL ATRIAL FIBRILLATION: ICD-10-CM

## 2021-10-12 DIAGNOSIS — E11.22 TYPE 2 DIABETES MELLITUS WITH STAGE 3B CHRONIC KIDNEY DISEASE, WITHOUT LONG-TERM CURRENT USE OF INSULIN: ICD-10-CM

## 2021-10-12 DIAGNOSIS — I10 ESSENTIAL HYPERTENSION: ICD-10-CM

## 2021-10-12 DIAGNOSIS — N18.32 STAGE 3B CHRONIC KIDNEY DISEASE: ICD-10-CM

## 2021-10-12 DIAGNOSIS — J44.9 MIXED TYPE COPD (CHRONIC OBSTRUCTIVE PULMONARY DISEASE): ICD-10-CM

## 2021-10-12 LAB
ALBUMIN SERPL BCP-MCNC: 3.5 G/DL (ref 3.5–5.2)
ALP SERPL-CCNC: 62 U/L (ref 55–135)
ALT SERPL W/O P-5'-P-CCNC: 18 U/L (ref 10–44)
ANION GAP SERPL CALC-SCNC: 11 MMOL/L (ref 8–16)
AST SERPL-CCNC: 18 U/L (ref 10–40)
BILIRUB SERPL-MCNC: 0.7 MG/DL (ref 0.1–1)
BUN SERPL-MCNC: 34 MG/DL (ref 8–23)
CALCIUM SERPL-MCNC: 10 MG/DL (ref 8.7–10.5)
CHLORIDE SERPL-SCNC: 102 MMOL/L (ref 95–110)
CHOLEST SERPL-MCNC: 123 MG/DL (ref 120–199)
CHOLEST/HDLC SERPL: 4.9 {RATIO} (ref 2–5)
CO2 SERPL-SCNC: 27 MMOL/L (ref 23–29)
CREAT SERPL-MCNC: 2.1 MG/DL (ref 0.5–1.4)
ERYTHROCYTE [DISTWIDTH] IN BLOOD BY AUTOMATED COUNT: 13.2 % (ref 11.5–14.5)
EST. GFR  (AFRICAN AMERICAN): 32 ML/MIN/1.73 M^2
EST. GFR  (NON AFRICAN AMERICAN): 27 ML/MIN/1.73 M^2
ESTIMATED AVG GLUCOSE: 148 MG/DL (ref 68–131)
GLUCOSE SERPL-MCNC: 125 MG/DL (ref 70–110)
HBA1C MFR BLD: 6.8 % (ref 4–5.6)
HCT VFR BLD AUTO: 48.2 % (ref 40–54)
HDLC SERPL-MCNC: 25 MG/DL (ref 40–75)
HDLC SERPL: 20.3 % (ref 20–50)
HGB BLD-MCNC: 15.3 G/DL (ref 14–18)
LDLC SERPL CALC-MCNC: 59.8 MG/DL (ref 63–159)
MCH RBC QN AUTO: 29.8 PG (ref 27–31)
MCHC RBC AUTO-ENTMCNC: 31.7 G/DL (ref 32–36)
MCV RBC AUTO: 94 FL (ref 82–98)
NONHDLC SERPL-MCNC: 98 MG/DL
PLATELET # BLD AUTO: 238 K/UL (ref 150–450)
PMV BLD AUTO: 9.4 FL (ref 9.2–12.9)
POTASSIUM SERPL-SCNC: 4.1 MMOL/L (ref 3.5–5.1)
PROT SERPL-MCNC: 7.1 G/DL (ref 6–8.4)
RBC # BLD AUTO: 5.13 M/UL (ref 4.6–6.2)
SODIUM SERPL-SCNC: 140 MMOL/L (ref 136–145)
TRIGL SERPL-MCNC: 191 MG/DL (ref 30–150)
TSH SERPL DL<=0.005 MIU/L-ACNC: 1.71 UIU/ML (ref 0.4–4)
WBC # BLD AUTO: 10.06 K/UL (ref 3.9–12.7)

## 2021-10-12 PROCEDURE — 80053 COMPREHEN METABOLIC PANEL: CPT | Performed by: INTERNAL MEDICINE

## 2021-10-12 PROCEDURE — 85027 COMPLETE CBC AUTOMATED: CPT | Performed by: INTERNAL MEDICINE

## 2021-10-12 PROCEDURE — 36415 COLL VENOUS BLD VENIPUNCTURE: CPT | Performed by: INTERNAL MEDICINE

## 2021-10-12 PROCEDURE — 80061 LIPID PANEL: CPT | Performed by: INTERNAL MEDICINE

## 2021-10-12 PROCEDURE — 84443 ASSAY THYROID STIM HORMONE: CPT | Performed by: INTERNAL MEDICINE

## 2021-10-12 PROCEDURE — 83036 HEMOGLOBIN GLYCOSYLATED A1C: CPT | Performed by: INTERNAL MEDICINE
